# Patient Record
Sex: MALE | Race: WHITE
[De-identification: names, ages, dates, MRNs, and addresses within clinical notes are randomized per-mention and may not be internally consistent; named-entity substitution may affect disease eponyms.]

---

## 2019-02-12 ENCOUNTER — HOSPITAL ENCOUNTER (EMERGENCY)
Dept: HOSPITAL 89 - ER | Age: 59
Discharge: HOME | End: 2019-02-12
Payer: MEDICARE

## 2019-02-12 VITALS — DIASTOLIC BLOOD PRESSURE: 89 MMHG | SYSTOLIC BLOOD PRESSURE: 145 MMHG

## 2019-02-12 DIAGNOSIS — J44.1: Primary | ICD-10-CM

## 2019-02-12 LAB — PLATELET COUNT, AUTOMATED: 192 K/UL (ref 150–450)

## 2019-02-12 PROCEDURE — 82947 ASSAY GLUCOSE BLOOD QUANT: CPT

## 2019-02-12 PROCEDURE — 84484 ASSAY OF TROPONIN QUANT: CPT

## 2019-02-12 PROCEDURE — 82435 ASSAY OF BLOOD CHLORIDE: CPT

## 2019-02-12 PROCEDURE — 96374 THER/PROPH/DIAG INJ IV PUSH: CPT

## 2019-02-12 PROCEDURE — 85379 FIBRIN DEGRADATION QUANT: CPT

## 2019-02-12 PROCEDURE — 84450 TRANSFERASE (AST) (SGOT): CPT

## 2019-02-12 PROCEDURE — 82310 ASSAY OF CALCIUM: CPT

## 2019-02-12 PROCEDURE — 82040 ASSAY OF SERUM ALBUMIN: CPT

## 2019-02-12 PROCEDURE — 84075 ASSAY ALKALINE PHOSPHATASE: CPT

## 2019-02-12 PROCEDURE — 71046 X-RAY EXAM CHEST 2 VIEWS: CPT

## 2019-02-12 PROCEDURE — 99284 EMERGENCY DEPT VISIT MOD MDM: CPT

## 2019-02-12 PROCEDURE — 84460 ALANINE AMINO (ALT) (SGPT): CPT

## 2019-02-12 PROCEDURE — 84155 ASSAY OF PROTEIN SERUM: CPT

## 2019-02-12 PROCEDURE — 84295 ASSAY OF SERUM SODIUM: CPT

## 2019-02-12 PROCEDURE — 71275 CT ANGIOGRAPHY CHEST: CPT

## 2019-02-12 PROCEDURE — 82565 ASSAY OF CREATININE: CPT

## 2019-02-12 PROCEDURE — 82374 ASSAY BLOOD CARBON DIOXIDE: CPT

## 2019-02-12 PROCEDURE — 93005 ELECTROCARDIOGRAM TRACING: CPT

## 2019-02-12 PROCEDURE — 82247 BILIRUBIN TOTAL: CPT

## 2019-02-12 PROCEDURE — 84132 ASSAY OF SERUM POTASSIUM: CPT

## 2019-02-12 PROCEDURE — 85025 COMPLETE CBC W/AUTO DIFF WBC: CPT

## 2019-02-12 PROCEDURE — 94640 AIRWAY INHALATION TREATMENT: CPT

## 2019-02-12 PROCEDURE — 83880 ASSAY OF NATRIURETIC PEPTIDE: CPT

## 2019-02-12 PROCEDURE — 84520 ASSAY OF UREA NITROGEN: CPT

## 2019-02-12 NOTE — EKG
FACILITY: [facility] - [place] 

 

PATIENT NAME: TAMI BLAIR

: [id number]

MR: [id number]

V: [id number]

EXAM DATE: 

ORDERING PHYSICIAN: KISHOR PEREZ

TECHNOLOGIST: ROSALINE

 

Test Reason : SOB \ CP

Blood Pressure : ***/*** mmHG

Vent. Rate : 066 BPM     Atrial Rate : 066 BPM

   P-R Int : 224 ms          QRS Dur : 092 ms

    QT Int : 378 ms       P-R-T Axes : 045 040 074 degrees

   QTc Int : 396 ms

 

Sinus rhythm with 1st degree AV block

Low voltage QRS

Septal infarct , age undetermined

Abnormal ECG

No previous ECGs available

Confirmed by Sal Eng ([id number]) on 2019 9:47:21 PM

 

Referred By:  CHRIS           Confirmed By:Sal Mackay

## 2019-02-12 NOTE — ER REPORT
History and Physical


Time Seen By MD:  12:13


Hx. of Stated Complaint:  


Patient short of breath and nausea.  Ongoing for 4-5 days.


HPI/ROS


CHIEF COMPLAINT: Shortness of breath





HISTORY OF PRESENT ILLNESS: 58-year-old male patient presents to emergency room 


with complaints shortness of breath. Patient states that he is visiting from 


[place]. He states that his daughter was ill, they try to go to urgent care and


were sent to the ER because of his low oxygen saturation in his she didn't have 


an ID. Patient states that he has been feeling short of breath for the past 5 


days. States he has some occasional chest pain. He denies any vomiting or 


diarrhea, however he states he has been nauseated. States he does have some pain


in the right upper quadrant especially after eating. He states that the pain 


typically resolves on its own. He states that he's been out of his inhalers for 


the past several months. He states that typically he would get samples from his 


primary care provider. He states that he does have a history of COPD and 


believes that this is likely related to that.





REVIEW OF SYSTEMS:


Respiratory: As noted above


Cardiovascular: Occasional chest pain which resolved spontaneously


Gastrointestinal: As noted above.


Musculoskeletal: No back pain.


Allergies:  


Coded Allergies:  


     No Known Drug Allergies (Unverified , 2/12/19)


Home Meds


Active Scripts


Prednisone (PREDNISONE) 20 Mg Tablet, 20 MG PO BID, #8 TAB


   Prov:JAMSE JIMÉNEZ KHADIJAH         2/12/19


Past Medical/Surgical History


Patient has a past medical history of hypertension, MI, hyperlipidemia, COPD, he


will fissures, diverticulitis, fibromyalgia, type 2 diabetes, marijuana use, 


alcohol use, skin cancer.


Patient has a past surgical history of appendectomy, 2 vessel CABG.


Reviewed Nurses Notes:  Yes


Hx Substance Use Disorder:  Yes (occ marijauna)


Hx Alcohol Use:  Yes (rare)


Constitutional





Vital Sign - Last 24 Hours








 2/12/19 2/12/19 2/12/19 2/12/19





 11:41 12:05 12:12 12:35


 


Temp 97.6  98.5 


 


Pulse 104  73 75


 


Resp 20  20 18


 


B/P (MAP) 141/99  145/89 (107) 


 


Pulse Ox 83  90 


 


O2 Delivery Room Air  Nasal Cannula 


 


O2 Flow Rate  4.0  


 


    





 2/12/19 2/12/19  





 12:35 12:43  


 


Pulse  69  


 


Resp  18  


 


Pulse Ox 90   


 


O2 Delivery Nasal Cannula   


 


O2 Flow Rate 2.0   








Physical Exam


  General Appearance: The patient is alert, has no immediate need for airway 


protection and no current signs of toxicity.


Respiratory: Chest is non tender, lungs are diminished with wheezing throughout 


to auscultation.


Cardiac: regular rate and rhythm, patient does have swelling to left lower 


extremity, patient states is not abnormal for him.


Gastrointestinal: Abdomen is soft and non tender, no masses, bowel sounds 


normal.


Musculoskeletal:  Neck: Neck is supple and non tender.


   Extremities have full range of motion and are non tender.


Skin: No rashes or lesions.





DIFFERENTIAL DIAGNOSIS: After history and physical exam differential diagnosis 


was considered for shortness of breath including but not limited to pulmonary 


infectious process, COPD, asthma, pulmonary embolus and congestive heart 


failure.





Medical Decision Making


Data Points


Result Diagram:  


2/12/19 1238                                                                    


           2/12/19 1238





Laboratory





Hematology








Test


 2/12/19


12:38


 


Red Blood Count


 6.98 M/uL


(4.00-5.60)


 


Mean Corpuscular Volume


 86.4 fL


(80.0-96.0)


 


Mean Corpuscular Hemoglobin


 28.2 pg


(26.0-33.0)


 


Mean Corpuscular Hemoglobin


Concent 32.7 g/dL


(32.0-36.0)


 


Red Cell Distribution Width


 15.8 %


(11.5-14.5)


 


Mean Platelet Volume


 8.3 fL


(7.2-11.1)


 


Neutrophils (%) (Auto)


 47.2 %


(39.4-72.5)


 


Lymphocytes (%) (Auto)


 40.1 %


(17.6-49.6)


 


Monocytes (%) (Auto)


 10.9 %


(4.1-12.4)


 


Eosinophils (%) (Auto)


 1.1 %


(0.4-6.7)


 


Basophils (%) (Auto)


 0.7 %


(0.3-1.4)


 


Nucleated RBC Relative Count


(auto) 0.1 /100WBC 





 


Neutrophils # (Auto)


 3.1 K/uL


(2.0-7.4)


 


Lymphocytes # (Auto)


 2.7 K/uL


(1.3-3.6)


 


Monocytes # (Auto)


 0.7 K/uL


(0.3-1.0)


 


Eosinophils # (Auto)


 0.1 K/uL


(0.0-0.5)


 


Basophils # (Auto)


 0.0 K/uL


(0.0-0.1)


 


Nucleated RBC Absolute Count


(auto) 0.01 K/uL 





 


D-Dimer Quantitative (PE/DVT)


 2.96 ug/ml


(0-0.50)


 


Sodium Level


 138 mmol/L


(137-145)


 


Potassium Level


 4.4 mmol/L


(3.5-5.0)


 


Chloride Level


 107 mmol/L


()


 


Carbon Dioxide Level


 26 mmol/L


(22-30)


 


Blood Urea Nitrogen


 15 mg/dl


(9-21)


 


Creatinine


 0.70 mg/dl


(0.66-1.25)


 


Glomerular Filtration Rate


Calc > 60.0 





 


Random Glucose


 187 mg/dl


()


 


Calcium Level


 9.1 mg/dl


(8.4-10.2)


 


Total Bilirubin


 0.4 mg/dl


(0.2-1.3)


 


Aspartate Amino Transf


(AST/SGOT) 30 U/L (0-35) 





 


Alanine Aminotransferase


(ALT/SGPT) 47 U/L (0-56) 





 


Alkaline Phosphatase 97 U/L (0-126) 


 


Troponin I < 0.012 ng/ml 


 


B-Type Natriuretic Peptide


 < 5 pg/ml


(0-100)


 


Total Protein


 7.3 g/dl


(6.3-8.2)


 


Albumin


 4.0 g/dl


(3.5-5.0)








Chemistry








Test


 2/12/19


12:38


 


White Blood Count


 6.7 k/uL


(4.5-11.0)


 


Red Blood Count


 6.98 M/uL


(4.00-5.60)


 


Hemoglobin


 19.7 g/dL


(14.0-18.0)


 


Hematocrit


 60.3 %


(42.0-52.0)


 


Mean Corpuscular Volume


 86.4 fL


(80.0-96.0)


 


Mean Corpuscular Hemoglobin


 28.2 pg


(26.0-33.0)


 


Mean Corpuscular Hemoglobin


Concent 32.7 g/dL


(32.0-36.0)


 


Red Cell Distribution Width


 15.8 %


(11.5-14.5)


 


Platelet Count


 192 K/uL


(150-450)


 


Mean Platelet Volume


 8.3 fL


(7.2-11.1)


 


Neutrophils (%) (Auto)


 47.2 %


(39.4-72.5)


 


Lymphocytes (%) (Auto)


 40.1 %


(17.6-49.6)


 


Monocytes (%) (Auto)


 10.9 %


(4.1-12.4)


 


Eosinophils (%) (Auto)


 1.1 %


(0.4-6.7)


 


Basophils (%) (Auto)


 0.7 %


(0.3-1.4)


 


Nucleated RBC Relative Count


(auto) 0.1 /100WBC 





 


Neutrophils # (Auto)


 3.1 K/uL


(2.0-7.4)


 


Lymphocytes # (Auto)


 2.7 K/uL


(1.3-3.6)


 


Monocytes # (Auto)


 0.7 K/uL


(0.3-1.0)


 


Eosinophils # (Auto)


 0.1 K/uL


(0.0-0.5)


 


Basophils # (Auto)


 0.0 K/uL


(0.0-0.1)


 


Nucleated RBC Absolute Count


(auto) 0.01 K/uL 





 


D-Dimer Quantitative (PE/DVT)


 2.96 ug/ml


(0-0.50)


 


Glomerular Filtration Rate


Calc > 60.0 





 


Calcium Level


 9.1 mg/dl


(8.4-10.2)


 


Total Bilirubin


 0.4 mg/dl


(0.2-1.3)


 


Aspartate Amino Transf


(AST/SGOT) 30 U/L (0-35) 





 


Alanine Aminotransferase


(ALT/SGPT) 47 U/L (0-56) 





 


Alkaline Phosphatase 97 U/L (0-126) 


 


Troponin I < 0.012 ng/ml 


 


B-Type Natriuretic Peptide


 < 5 pg/ml


(0-100)


 


Total Protein


 7.3 g/dl


(6.3-8.2)


 


Albumin


 4.0 g/dl


(3.5-5.0)








Coagulation








Test


 2/12/19


12:38


 


D-Dimer Quantitative (PE/DVT) 2.96 ug/ml 











EKG/Imaging


Imaging


EXAMINATION:   CTA of the chest with IV contrast


 


HISTORY:  Shortness of breath. Elevated d-dimer.


 


TECHNIQUE:   Pulmonary embolus protocol - Thin axial CT images of the chest were


obtained with IV contrast during maximal pulmonary arterial opacification. 


Reconstruction of the source data includes multiplanar 2D coronal and sagittal 


reconstructed images, and 3D coronal and sagittal MIP images. Representative 


images have been stored on PACS.


One of the following dose optimization techniques was utilized in the 


performance of this exam: Automated exposure control; adjustment of the mA 


and/or kV according to the patient's size; or use of an iterative  reconstru


ction technique.  Specific details can be referenced in the facility's radiology


CT exam operational policy.


Contrast:   140 mL of IV Isovue-300.


 


COMPARISON:   None.


 


FINDINGS:


Pulmonary arteries:  The pulmonary arteries are moderately well opacified, 


without suspicious filling defect.


Heart, aorta, and great vessels:  Normal caliber thoracic aorta, without 


aneurysm or dissection. Sternotomy with surgical changes of CABG. Normal heart 


size. No pericardial effusion.


Lungs and pleura:  Slight scarring or atelectasis in the lower lungs. No 


suspicious focal consolidation. There is mild diffuse bronchial wall thickening.


No pleural effusion or pneumothorax.


Mediastinum and king:  Negative.


 


Visualized upper abdomen:  Unremarkable.


Chest wall:  Negative.


Bones:  No acute osseous findings. Scattered degenerative changes along the 


spine.


 


IMPRESSION:


1. No evidence of pulmonary embolism.


2. Mild diffuse bronchial wall thickening may be compatible with an acute or 


chronic bronchitis. No evidence of a focal pneumonia. Mild scarring or 


atelectasis in the lower lungs.


3. Sternotomy with surgical changes of CABG.


 


Report Dictated By: Anibal Wolff MD at 2/12/2019 2:09 PM


 


Report E-Signed By: Anibal Wolff MD  at 2/12/2019 2:18 PM





CHEST PA LAT


 


Indication: RESP DISTRESS


 


Comparison: None.


 


Findings:


 


Lungs: Prominent interstitial markings are seen to both lungs.  Linear atelectas


is/scar left lung base is seen.


 


Mediastinum/pulmonary vasculature: Heart size and pulmonary vasculature are 


normal.


 


Bones/soft tissues: Sternotomy wires are seen.


 


IMPRESSION:


Prominent interstitial markings both lungs.  Differential diagnosis includes 


chronic interstitial changes, and pulmonary vasculature congestion.


 


Report Dictated By: Gene Viveros at 2/12/2019 1:40 PM


 


Report E-Signed By: Gene Viveros  at 2/12/2019 1:41 PM





ED Course/Re-evaluation


ED Course


Patient was admitted to exam room, history and physical were obtained. 


Differential diagnoses were considered. On examination lungs are diminished with


wheezing, heart is regular, abdomen is soft and nontender. An IV was started, a 


CBC, CMP, BNP, troponin, EKG, chest x-ray, nebulizer treatment were done. 


Patient also received a dose of 125 mg slightly Medrol. A d-dimer was also done.


Troponin was negative, BNP was negative, CBC and CMP were also unremarkable. EKG


showed a normal sinus rhythm. Chest x-ray showed no acute findings but likely 


chronic bronchitis. Patient did have an elevated d-dimer of 2.5. I discussed 


this with the patient. We did go ahead and do a CT pulmonary angiogram. There is


no obvious clot found. They found signs of chronic bronchitis. I discussed 


findings with the patient. We will go ahead and discharge him home at this time.


Patient will be given an order for home oxygen, he does have home oxygen back 


home in [place] but did not bring it with him to [place]. Patient is follow-up 


with emergency room if condition worsens. Patient verbalized understanding and 


agreement with plan. We will go ahead and treat him with prednisone for the next


5 days, will also get him back on his inhalers, albuterol and Advair.


Decision to Disposition Date:  Feb 12, 2019


Decision to Disposition Time:  14:42





Depart


Departure


Latest Vital Signs





Vital Signs








  Date Time  Temp Pulse Resp B/P (MAP) Pulse Ox O2 Delivery O2 Flow Rate FiO2


 


2/12/19 12:43  69 18     


 


2/12/19 12:35     90 Nasal Cannula 2.0 


 


2/12/19 12:12 98.5   145/89 (107)    








Impression:  


   Primary Impression:  


   COPD exacerbation


Condition:  Improved


Disposition:  HOME OR SELF-CARE


New Scripts


Prednisone (PREDNISONE) 20 Mg Tablet


20 MG PO BID, #8 TAB


   Prov: JAMES JIMÉNEZ         2/12/19


Departure Forms:  ER Transition Record, Home Oxygen, Nebulizer RX,       Durable


Medical Equipment-Oxygen:  Oxygen Concentrator, Portable Oxygen Gas


   Reason for Use/Diagnosis:  COPD exacerbation, hypoxia


   Start Date of the Order:  Feb 12, 2019


   Dosage or Concentration (if applicable) - LPM:  2


   Route of Administration (if applicable):  Nasal Cannula


   Frequency of Use:  Continuous


   Duration Home O2 Required:  2


   Duration Units:  Weeks


   Room Air Oxygen Saturation:  83


   ER Prescribing Physician's Name:  James Jiménez


   NPI Numbers for Local ER MDs:  Tino [id number]


Medications Reconciliation,    Patient Portal Information


Patient Instructions:  COPD (Chronic Obstructive Pulmonary Disease) (ED)





Additional Instructions:  


Increase fluid intake.


Get plenty of rest.


Follow up with your primary care provider in the next week.


No Metformin for the next 3 days.


The Prednisone will make your blood sugar higher.


Return to the ER if condition worsens.











JAMES JIMÉNEZ                Feb 12, 2019 12:13

## 2019-02-12 NOTE — RADIOLOGY IMAGING REPORT
FACILITY: [facility] - [place] 

 

PATIENT NAME: Benjamin Uribe

: 1960

MR: [id number]

V: [id number]

EXAM DATE: 

ORDERING PHYSICIAN: KISHOR PEREZ

TECHNOLOGIST: 

 

Location: [facility]

Patient: Benjamin Uribe

: 1960

MRN: [id number]

Visit/Account:[id number]

Date of Sevice:  2019

 

ACCESSION #: [id number]

 

EXAMINATION:   CTA of the chest with IV contrast

 

HISTORY:  Shortness of breath. Elevated d-dimer.

 

TECHNIQUE:   Pulmonary embolus protocol - Thin axial CT images of the chest were obtained with IV con
trast during maximal pulmonary arterial opacification. Reconstruction of the source data includes mul
tiplanar 2D coronal and sagittal reconstructed images, and 3D coronal and sagittal MIP images. Repres
entative images have been stored on PACS.

One of the following dose optimization techniques was utilized in the performance of this exam: Autom
ated exposure control; adjustment of the mA and/or kV according to the patient's size; or use of an i
terative  reconstruction technique.  Specific details can be referenced in the facility's radiology C
T exam operational policy.

Contrast:   140 mL of IV Isovue-300.

 

COMPARISON:   None.

 

FINDINGS:

Pulmonary arteries:  The pulmonary arteries are moderately well opacified, without suspicious filling
 defect.

Heart, aorta, and great vessels:  Normal caliber thoracic aorta, without aneurysm or dissection. Ster
notomy with surgical changes of CABG. Normal heart size. No pericardial effusion.

Lungs and pleura:  Slight scarring or atelectasis in the lower lungs. No suspicious focal consolidati
on. There is mild diffuse bronchial wall thickening. No pleural effusion or pneumothorax.

Mediastinum and king:  Negative.

 

Visualized upper abdomen:  Unremarkable.

Chest wall:  Negative.

Bones:  No acute osseous findings. Scattered degenerative changes along the spine.

 

IMPRESSION:

1. No evidence of pulmonary embolism.

2. Mild diffuse bronchial wall thickening may be compatible with an acute or chronic bronchitis. No e
vidence of a focal pneumonia. Mild scarring or atelectasis in the lower lungs.

3. Sternotomy with surgical changes of CABG.

 

Report Dictated By: Anibal Wolff MD at 2019 2:09 PM

 

Report E-Signed By: Anibal Wolff MD  at 2019 2:18 PM

 

WSN:[id number]

## 2019-02-12 NOTE — RADIOLOGY IMAGING REPORT
FACILITY: [facility] - [place] 

 

PATIENT NAME: Benjamin Uribe

: 1960

MR: [id number]

V: [id number]

EXAM DATE: 

ORDERING PHYSICIAN: KISHOR PEREZ

TECHNOLOGIST: 

 

Location: [facility] - [place]

Patient: Benjamin Uribe

: 1960

MRN: [id number]

Visit/Account:[id number]

Date of Sevice:  2019

 

ACCESSION #: [id number]

 

CHEST PA LAT

 

Indication: RESP DISTRESS

 

Comparison: None.

 

Findings:

 

Lungs: Prominent interstitial markings are seen to both lungs.  Linear atelectasis/scar left lung bas
e is seen.

 

Mediastinum/pulmonary vasculature: Heart size and pulmonary vasculature are normal.

 

Bones/soft tissues: Sternotomy wires are seen.

 

IMPRESSION:

Prominent interstitial markings both lungs.  Differential diagnosis includes chronic interstitial ramses
nges, and pulmonary vasculature congestion.

 

 

 

 

 

Report Dictated By: Gene Viveros at 2019 1:40 PM

 

Report E-Signed By: Gene Viveros  at 2019 1:41 PM

 

WSN:[id number]

## 2020-05-16 ENCOUNTER — HOSPITAL ENCOUNTER (INPATIENT)
Dept: HOSPITAL 47 - EC | Age: 60
LOS: 2 days | Discharge: HOME | DRG: 638 | End: 2020-05-18
Attending: INTERNAL MEDICINE | Admitting: INTERNAL MEDICINE
Payer: MEDICARE

## 2020-05-16 VITALS — BODY MASS INDEX: 49.5 KG/M2

## 2020-05-16 DIAGNOSIS — Z83.3: ICD-10-CM

## 2020-05-16 DIAGNOSIS — Z80.0: ICD-10-CM

## 2020-05-16 DIAGNOSIS — Z82.3: ICD-10-CM

## 2020-05-16 DIAGNOSIS — L40.9: ICD-10-CM

## 2020-05-16 DIAGNOSIS — E66.01: ICD-10-CM

## 2020-05-16 DIAGNOSIS — Z95.1: ICD-10-CM

## 2020-05-16 DIAGNOSIS — Z11.59: ICD-10-CM

## 2020-05-16 DIAGNOSIS — T38.3X6A: ICD-10-CM

## 2020-05-16 DIAGNOSIS — I25.10: ICD-10-CM

## 2020-05-16 DIAGNOSIS — E11.65: Primary | ICD-10-CM

## 2020-05-16 DIAGNOSIS — E86.0: ICD-10-CM

## 2020-05-16 DIAGNOSIS — Z95.5: ICD-10-CM

## 2020-05-16 DIAGNOSIS — M79.7: ICD-10-CM

## 2020-05-16 DIAGNOSIS — Z90.49: ICD-10-CM

## 2020-05-16 DIAGNOSIS — F17.210: ICD-10-CM

## 2020-05-16 DIAGNOSIS — J44.9: ICD-10-CM

## 2020-05-16 DIAGNOSIS — Z91.120: ICD-10-CM

## 2020-05-16 LAB
ALBUMIN SERPL-MCNC: 3.7 G/DL (ref 3.5–5)
ALP SERPL-CCNC: 126 U/L (ref 38–126)
ALT SERPL-CCNC: 24 U/L (ref 4–49)
ANION GAP SERPL CALC-SCNC: 8 MMOL/L
AST SERPL-CCNC: 24 U/L (ref 17–59)
BASOPHILS # BLD AUTO: 0.1 K/UL (ref 0–0.2)
BASOPHILS NFR BLD AUTO: 1 %
BUN SERPL-SCNC: 19 MG/DL (ref 9–20)
CALCIUM SPEC-MCNC: 9.4 MG/DL (ref 8.4–10.2)
CHLORIDE SERPL-SCNC: 100 MMOL/L (ref 98–107)
CO2 SERPL-SCNC: 24 MMOL/L (ref 22–30)
EOSINOPHIL # BLD AUTO: 0.1 K/UL (ref 0–0.7)
EOSINOPHIL NFR BLD AUTO: 2 %
ERYTHROCYTE [DISTWIDTH] IN BLOOD BY AUTOMATED COUNT: 5.52 M/UL (ref 4.3–5.9)
ERYTHROCYTE [DISTWIDTH] IN BLOOD: 13.1 % (ref 11.5–15.5)
GLUCOSE BLD-MCNC: 226 MG/DL (ref 75–99)
GLUCOSE BLD-MCNC: 301 MG/DL (ref 75–99)
GLUCOSE BLD-MCNC: 305 MG/DL (ref 75–99)
GLUCOSE BLD-MCNC: 311 MG/DL (ref 75–99)
GLUCOSE BLD-MCNC: 326 MG/DL (ref 75–99)
GLUCOSE BLD-MCNC: 407 MG/DL (ref 75–99)
GLUCOSE BLD-MCNC: 408 MG/DL (ref 75–99)
GLUCOSE SERPL-MCNC: 436 MG/DL (ref 74–99)
GLUCOSE UR QL: (no result)
HCO3 BLDV-SCNC: 25 MMOL/L (ref 24–28)
HCT VFR BLD AUTO: 51.4 % (ref 39–53)
HGB BLD-MCNC: 16.5 GM/DL (ref 13–17.5)
KETONES UR QL STRIP.AUTO: (no result)
LYMPHOCYTES # SPEC AUTO: 2 K/UL (ref 1–4.8)
LYMPHOCYTES NFR SPEC AUTO: 34 %
MCH RBC QN AUTO: 29.8 PG (ref 25–35)
MCHC RBC AUTO-ENTMCNC: 32 G/DL (ref 31–37)
MCV RBC AUTO: 93.1 FL (ref 80–100)
MONOCYTES # BLD AUTO: 0.4 K/UL (ref 0–1)
MONOCYTES NFR BLD AUTO: 7 %
NEUTROPHILS # BLD AUTO: 3.1 K/UL (ref 1.3–7.7)
NEUTROPHILS NFR BLD AUTO: 53 %
PCO2 BLDV: 47 MMHG (ref 37–51)
PH BLDV: 7.34 [PH] (ref 7.31–7.41)
PH UR: 5 [PH] (ref 5–8)
PLATELET # BLD AUTO: 182 K/UL (ref 150–450)
POTASSIUM SERPL-SCNC: 4.6 MMOL/L (ref 3.5–5.1)
PROT SERPL-MCNC: 6.8 G/DL (ref 6.3–8.2)
SODIUM SERPL-SCNC: 132 MMOL/L (ref 137–145)
SP GR UR: 1.03 (ref 1–1.03)
UROBILINOGEN UR QL STRIP: <2 MG/DL (ref ?–2)
WBC # BLD AUTO: 5.9 K/UL (ref 3.8–10.6)

## 2020-05-16 PROCEDURE — 99291 CRITICAL CARE FIRST HOUR: CPT

## 2020-05-16 PROCEDURE — 81003 URINALYSIS AUTO W/O SCOPE: CPT

## 2020-05-16 PROCEDURE — 83036 HEMOGLOBIN GLYCOSYLATED A1C: CPT

## 2020-05-16 PROCEDURE — 93005 ELECTROCARDIOGRAM TRACING: CPT

## 2020-05-16 PROCEDURE — 85025 COMPLETE CBC W/AUTO DIFF WBC: CPT

## 2020-05-16 PROCEDURE — 80053 COMPREHEN METABOLIC PANEL: CPT

## 2020-05-16 PROCEDURE — 71046 X-RAY EXAM CHEST 2 VIEWS: CPT

## 2020-05-16 PROCEDURE — 87635 SARS-COV-2 COVID-19 AMP PRB: CPT

## 2020-05-16 PROCEDURE — 83880 ASSAY OF NATRIURETIC PEPTIDE: CPT

## 2020-05-16 PROCEDURE — 36415 COLL VENOUS BLD VENIPUNCTURE: CPT

## 2020-05-16 PROCEDURE — 82009 KETONE BODYS QUAL: CPT

## 2020-05-16 PROCEDURE — 82803 BLOOD GASES ANY COMBINATION: CPT

## 2020-05-16 PROCEDURE — 96360 HYDRATION IV INFUSION INIT: CPT

## 2020-05-16 RX ADMIN — INSULIN ASPART SCH UNIT: 100 INJECTION, SOLUTION INTRAVENOUS; SUBCUTANEOUS at 20:17

## 2020-05-16 RX ADMIN — CEFAZOLIN SCH MLS/HR: 330 INJECTION, POWDER, FOR SOLUTION INTRAMUSCULAR; INTRAVENOUS at 20:19

## 2020-05-16 RX ADMIN — FAMOTIDINE SCH MG: 10 INJECTION, SOLUTION INTRAVENOUS at 20:17

## 2020-05-16 RX ADMIN — HEPARIN SODIUM SCH UNIT: 5000 INJECTION, SOLUTION INTRAVENOUS; SUBCUTANEOUS at 20:17

## 2020-05-16 RX ADMIN — NICOTINE SCH PATCH: 21 PATCH, EXTENDED RELEASE TRANSDERMAL at 17:29

## 2020-05-16 RX ADMIN — INSULIN ASPART SCH UNIT: 100 INJECTION, SOLUTION INTRAVENOUS; SUBCUTANEOUS at 17:29

## 2020-05-17 LAB
GLUCOSE BLD-MCNC: 257 MG/DL (ref 75–99)
GLUCOSE BLD-MCNC: 257 MG/DL (ref 75–99)
GLUCOSE BLD-MCNC: 286 MG/DL (ref 75–99)
GLUCOSE BLD-MCNC: 313 MG/DL (ref 75–99)

## 2020-05-17 RX ADMIN — FAMOTIDINE SCH MG: 10 INJECTION, SOLUTION INTRAVENOUS at 21:27

## 2020-05-17 RX ADMIN — NYSTATIN SCH UNIT: 100000 SUSPENSION ORAL at 12:23

## 2020-05-17 RX ADMIN — FAMOTIDINE SCH MG: 10 INJECTION, SOLUTION INTRAVENOUS at 07:52

## 2020-05-17 RX ADMIN — INSULIN ASPART SCH UNIT: 100 INJECTION, SOLUTION INTRAVENOUS; SUBCUTANEOUS at 06:26

## 2020-05-17 RX ADMIN — NYSTATIN SCH UNIT: 100000 SUSPENSION ORAL at 21:26

## 2020-05-17 RX ADMIN — NYSTATIN SCH UNIT: 100000 SUSPENSION ORAL at 17:33

## 2020-05-17 RX ADMIN — NICOTINE SCH PATCH: 21 PATCH, EXTENDED RELEASE TRANSDERMAL at 07:52

## 2020-05-17 RX ADMIN — CEFAZOLIN SCH MLS/HR: 330 INJECTION, POWDER, FOR SOLUTION INTRAMUSCULAR; INTRAVENOUS at 07:53

## 2020-05-17 RX ADMIN — HEPARIN SODIUM SCH UNIT: 5000 INJECTION, SOLUTION INTRAVENOUS; SUBCUTANEOUS at 07:52

## 2020-05-17 RX ADMIN — INSULIN ASPART SCH UNIT: 100 INJECTION, SOLUTION INTRAVENOUS; SUBCUTANEOUS at 12:23

## 2020-05-17 RX ADMIN — INSULIN ASPART SCH UNIT: 100 INJECTION, SOLUTION INTRAVENOUS; SUBCUTANEOUS at 17:35

## 2020-05-17 RX ADMIN — HEPARIN SODIUM SCH UNIT: 5000 INJECTION, SOLUTION INTRAVENOUS; SUBCUTANEOUS at 21:26

## 2020-05-17 RX ADMIN — INSULIN ASPART SCH UNIT: 100 INJECTION, SOLUTION INTRAVENOUS; SUBCUTANEOUS at 21:27

## 2020-05-18 VITALS — DIASTOLIC BLOOD PRESSURE: 85 MMHG | TEMPERATURE: 98.2 F | HEART RATE: 70 BPM | SYSTOLIC BLOOD PRESSURE: 138 MMHG

## 2020-05-18 VITALS — RESPIRATION RATE: 18 BRPM

## 2020-05-18 LAB
GLUCOSE BLD-MCNC: 240 MG/DL (ref 75–99)
GLUCOSE BLD-MCNC: 249 MG/DL (ref 75–99)
GLUCOSE BLD-MCNC: 252 MG/DL (ref 75–99)
HBA1C MFR BLD: 15.7 % (ref 4–6)

## 2020-05-18 RX ADMIN — NYSTATIN SCH UNIT: 100000 SUSPENSION ORAL at 08:18

## 2020-05-18 RX ADMIN — NYSTATIN SCH UNIT: 100000 SUSPENSION ORAL at 12:36

## 2020-05-18 RX ADMIN — INSULIN ASPART SCH: 100 INJECTION, SOLUTION INTRAVENOUS; SUBCUTANEOUS at 17:46

## 2020-05-18 RX ADMIN — INSULIN ASPART SCH UNIT: 100 INJECTION, SOLUTION INTRAVENOUS; SUBCUTANEOUS at 08:20

## 2020-05-18 RX ADMIN — HEPARIN SODIUM SCH UNIT: 5000 INJECTION, SOLUTION INTRAVENOUS; SUBCUTANEOUS at 08:19

## 2020-05-18 RX ADMIN — NICOTINE SCH PATCH: 21 PATCH, EXTENDED RELEASE TRANSDERMAL at 08:19

## 2020-05-18 RX ADMIN — FAMOTIDINE SCH MG: 10 INJECTION, SOLUTION INTRAVENOUS at 08:18

## 2020-05-18 RX ADMIN — NYSTATIN SCH UNIT: 100000 SUSPENSION ORAL at 17:46

## 2020-05-18 RX ADMIN — INSULIN ASPART SCH UNIT: 100 INJECTION, SOLUTION INTRAVENOUS; SUBCUTANEOUS at 12:36

## 2021-11-26 ENCOUNTER — HOSPITAL ENCOUNTER (EMERGENCY)
Dept: HOSPITAL 47 - EC | Age: 61
Discharge: HOME | End: 2021-11-26
Payer: MEDICARE

## 2021-11-26 VITALS
DIASTOLIC BLOOD PRESSURE: 80 MMHG | RESPIRATION RATE: 20 BRPM | SYSTOLIC BLOOD PRESSURE: 127 MMHG | HEART RATE: 60 BPM | TEMPERATURE: 98.8 F

## 2021-11-26 DIAGNOSIS — E11.9: ICD-10-CM

## 2021-11-26 DIAGNOSIS — J44.9: ICD-10-CM

## 2021-11-26 DIAGNOSIS — M79.7: ICD-10-CM

## 2021-11-26 DIAGNOSIS — L03.116: Primary | ICD-10-CM

## 2021-11-26 DIAGNOSIS — E66.01: ICD-10-CM

## 2021-11-26 DIAGNOSIS — Z79.84: ICD-10-CM

## 2021-11-26 DIAGNOSIS — F12.90: ICD-10-CM

## 2021-11-26 DIAGNOSIS — Z79.4: ICD-10-CM

## 2021-11-26 LAB
ALBUMIN SERPL-MCNC: 3.6 G/DL (ref 3.5–5)
ALP SERPL-CCNC: 79 U/L (ref 38–126)
ALT SERPL-CCNC: 43 U/L (ref 4–49)
ANION GAP SERPL CALC-SCNC: 6 MMOL/L
AST SERPL-CCNC: 41 U/L (ref 17–59)
BASOPHILS # BLD AUTO: 0 K/UL (ref 0–0.2)
BASOPHILS NFR BLD AUTO: 1 %
BUN SERPL-SCNC: 18 MG/DL (ref 9–20)
CALCIUM SPEC-MCNC: 8.8 MG/DL (ref 8.4–10.2)
CHLORIDE SERPL-SCNC: 102 MMOL/L (ref 98–107)
CO2 SERPL-SCNC: 27 MMOL/L (ref 22–30)
EOSINOPHIL # BLD AUTO: 0.1 K/UL (ref 0–0.7)
EOSINOPHIL NFR BLD AUTO: 1 %
ERYTHROCYTE [DISTWIDTH] IN BLOOD BY AUTOMATED COUNT: 5.86 M/UL (ref 4.3–5.9)
ERYTHROCYTE [DISTWIDTH] IN BLOOD: 14.1 % (ref 11.5–15.5)
GLUCOSE SERPL-MCNC: 96 MG/DL (ref 74–99)
HCT VFR BLD AUTO: 52.2 % (ref 39–53)
HGB BLD-MCNC: 16.7 GM/DL (ref 13–17.5)
LYMPHOCYTES # SPEC AUTO: 1.5 K/UL (ref 1–4.8)
LYMPHOCYTES NFR SPEC AUTO: 33 %
MCH RBC QN AUTO: 28.4 PG (ref 25–35)
MCHC RBC AUTO-ENTMCNC: 32 G/DL (ref 31–37)
MCV RBC AUTO: 89 FL (ref 80–100)
MONOCYTES # BLD AUTO: 0.6 K/UL (ref 0–1)
MONOCYTES NFR BLD AUTO: 12 %
NEUTROPHILS # BLD AUTO: 2.4 K/UL (ref 1.3–7.7)
NEUTROPHILS NFR BLD AUTO: 51 %
PLATELET # BLD AUTO: 187 K/UL (ref 150–450)
POTASSIUM SERPL-SCNC: 4.6 MMOL/L (ref 3.5–5.1)
PROT SERPL-MCNC: 7.1 G/DL (ref 6.3–8.2)
SODIUM SERPL-SCNC: 135 MMOL/L (ref 137–145)
WBC # BLD AUTO: 4.7 K/UL (ref 3.8–10.6)

## 2021-11-26 PROCEDURE — 93971 EXTREMITY STUDY: CPT

## 2021-11-26 PROCEDURE — 73590 X-RAY EXAM OF LOWER LEG: CPT

## 2021-11-26 PROCEDURE — 87040 BLOOD CULTURE FOR BACTERIA: CPT

## 2021-11-26 PROCEDURE — 83605 ASSAY OF LACTIC ACID: CPT

## 2021-11-26 PROCEDURE — 85025 COMPLETE CBC W/AUTO DIFF WBC: CPT

## 2021-11-26 PROCEDURE — 96375 TX/PRO/DX INJ NEW DRUG ADDON: CPT

## 2021-11-26 PROCEDURE — 80053 COMPREHEN METABOLIC PANEL: CPT

## 2021-11-26 PROCEDURE — 99284 EMERGENCY DEPT VISIT MOD MDM: CPT

## 2021-11-26 PROCEDURE — 36415 COLL VENOUS BLD VENIPUNCTURE: CPT

## 2021-11-26 PROCEDURE — 96374 THER/PROPH/DIAG INJ IV PUSH: CPT

## 2021-12-05 ENCOUNTER — HOSPITAL ENCOUNTER (EMERGENCY)
Dept: HOSPITAL 47 - EC | Age: 61
Discharge: HOME | End: 2021-12-05
Payer: MEDICARE

## 2021-12-05 VITALS
HEART RATE: 61 BPM | SYSTOLIC BLOOD PRESSURE: 138 MMHG | DIASTOLIC BLOOD PRESSURE: 80 MMHG | TEMPERATURE: 98.9 F | RESPIRATION RATE: 15 BRPM

## 2021-12-05 DIAGNOSIS — L03.116: Primary | ICD-10-CM

## 2021-12-05 DIAGNOSIS — E11.9: ICD-10-CM

## 2021-12-05 DIAGNOSIS — Z79.82: ICD-10-CM

## 2021-12-05 DIAGNOSIS — J44.9: ICD-10-CM

## 2021-12-05 PROCEDURE — 96372 THER/PROPH/DIAG INJ SC/IM: CPT

## 2021-12-05 PROCEDURE — 99283 EMERGENCY DEPT VISIT LOW MDM: CPT

## 2022-04-26 ENCOUNTER — HOSPITAL ENCOUNTER (OUTPATIENT)
Dept: HOSPITAL 47 - EC | Age: 62
Setting detail: OBSERVATION
LOS: 1 days | Discharge: HOME | End: 2022-04-27
Attending: INTERNAL MEDICINE | Admitting: INTERNAL MEDICINE
Payer: MEDICARE

## 2022-04-26 DIAGNOSIS — G89.29: ICD-10-CM

## 2022-04-26 DIAGNOSIS — I89.0: ICD-10-CM

## 2022-04-26 DIAGNOSIS — Z80.0: ICD-10-CM

## 2022-04-26 DIAGNOSIS — Z90.49: ICD-10-CM

## 2022-04-26 DIAGNOSIS — Z82.49: ICD-10-CM

## 2022-04-26 DIAGNOSIS — E66.9: ICD-10-CM

## 2022-04-26 DIAGNOSIS — F17.200: ICD-10-CM

## 2022-04-26 DIAGNOSIS — E11.622: ICD-10-CM

## 2022-04-26 DIAGNOSIS — Z83.3: ICD-10-CM

## 2022-04-26 DIAGNOSIS — L40.9: ICD-10-CM

## 2022-04-26 DIAGNOSIS — Z79.84: ICD-10-CM

## 2022-04-26 DIAGNOSIS — Z95.1: ICD-10-CM

## 2022-04-26 DIAGNOSIS — Z71.3: ICD-10-CM

## 2022-04-26 DIAGNOSIS — R42: ICD-10-CM

## 2022-04-26 DIAGNOSIS — I87.332: ICD-10-CM

## 2022-04-26 DIAGNOSIS — L97.221: ICD-10-CM

## 2022-04-26 DIAGNOSIS — L03.116: Primary | ICD-10-CM

## 2022-04-26 DIAGNOSIS — M79.7: ICD-10-CM

## 2022-04-26 DIAGNOSIS — J44.9: ICD-10-CM

## 2022-04-26 DIAGNOSIS — Z82.3: ICD-10-CM

## 2022-04-26 DIAGNOSIS — Z79.4: ICD-10-CM

## 2022-04-26 LAB
ALBUMIN SERPL-MCNC: 3.7 G/DL (ref 3.5–5)
ALP SERPL-CCNC: 94 U/L (ref 38–126)
ALT SERPL-CCNC: 23 U/L (ref 4–49)
ANION GAP SERPL CALC-SCNC: 2 MMOL/L
AST SERPL-CCNC: 23 U/L (ref 17–59)
BASOPHILS # BLD AUTO: 0 K/UL (ref 0–0.2)
BASOPHILS NFR BLD AUTO: 1 %
BUN SERPL-SCNC: 15 MG/DL (ref 9–20)
CALCIUM SPEC-MCNC: 9 MG/DL (ref 8.4–10.2)
CHLORIDE SERPL-SCNC: 102 MMOL/L (ref 98–107)
CO2 SERPL-SCNC: 34 MMOL/L (ref 22–30)
EOSINOPHIL # BLD AUTO: 0.1 K/UL (ref 0–0.7)
EOSINOPHIL NFR BLD AUTO: 1 %
ERYTHROCYTE [DISTWIDTH] IN BLOOD BY AUTOMATED COUNT: 5.99 M/UL (ref 4.3–5.9)
ERYTHROCYTE [DISTWIDTH] IN BLOOD: 14.6 % (ref 11.5–15.5)
GLUCOSE BLD-MCNC: 159 MG/DL (ref 75–99)
GLUCOSE BLD-MCNC: 98 MG/DL (ref 75–99)
GLUCOSE SERPL-MCNC: 118 MG/DL (ref 74–99)
HCT VFR BLD AUTO: 53.9 % (ref 39–53)
HGB BLD-MCNC: 17.5 GM/DL (ref 13–17.5)
LYMPHOCYTES # SPEC AUTO: 1.5 K/UL (ref 1–4.8)
LYMPHOCYTES NFR SPEC AUTO: 22 %
MCH RBC QN AUTO: 29.3 PG (ref 25–35)
MCHC RBC AUTO-ENTMCNC: 32.5 G/DL (ref 31–37)
MCV RBC AUTO: 90 FL (ref 80–100)
MONOCYTES # BLD AUTO: 0.5 K/UL (ref 0–1)
MONOCYTES NFR BLD AUTO: 8 %
NEUTROPHILS # BLD AUTO: 4.6 K/UL (ref 1.3–7.7)
NEUTROPHILS NFR BLD AUTO: 67 %
PLATELET # BLD AUTO: 253 K/UL (ref 150–450)
POTASSIUM SERPL-SCNC: 4.6 MMOL/L (ref 3.5–5.1)
PROT SERPL-MCNC: 7.3 G/DL (ref 6.3–8.2)
SODIUM SERPL-SCNC: 138 MMOL/L (ref 137–145)
WBC # BLD AUTO: 6.9 K/UL (ref 3.8–10.6)

## 2022-04-26 PROCEDURE — 86140 C-REACTIVE PROTEIN: CPT

## 2022-04-26 PROCEDURE — 96376 TX/PRO/DX INJ SAME DRUG ADON: CPT

## 2022-04-26 PROCEDURE — 85025 COMPLETE CBC W/AUTO DIFF WBC: CPT

## 2022-04-26 PROCEDURE — 87186 SC STD MICRODIL/AGAR DIL: CPT

## 2022-04-26 PROCEDURE — 96375 TX/PRO/DX INJ NEW DRUG ADDON: CPT

## 2022-04-26 PROCEDURE — 96367 TX/PROPH/DG ADDL SEQ IV INF: CPT

## 2022-04-26 PROCEDURE — 99284 EMERGENCY DEPT VISIT MOD MDM: CPT

## 2022-04-26 PROCEDURE — 87070 CULTURE OTHR SPECIMN AEROBIC: CPT

## 2022-04-26 PROCEDURE — 36415 COLL VENOUS BLD VENIPUNCTURE: CPT

## 2022-04-26 PROCEDURE — 87040 BLOOD CULTURE FOR BACTERIA: CPT

## 2022-04-26 PROCEDURE — 87205 SMEAR GRAM STAIN: CPT

## 2022-04-26 PROCEDURE — 96372 THER/PROPH/DIAG INJ SC/IM: CPT

## 2022-04-26 PROCEDURE — 80053 COMPREHEN METABOLIC PANEL: CPT

## 2022-04-26 PROCEDURE — 83735 ASSAY OF MAGNESIUM: CPT

## 2022-04-26 PROCEDURE — 93971 EXTREMITY STUDY: CPT

## 2022-04-26 PROCEDURE — 96366 THER/PROPH/DIAG IV INF ADDON: CPT

## 2022-04-26 PROCEDURE — 96365 THER/PROPH/DIAG IV INF INIT: CPT

## 2022-04-26 PROCEDURE — 80048 BASIC METABOLIC PNL TOTAL CA: CPT

## 2022-04-26 PROCEDURE — 87077 CULTURE AEROBIC IDENTIFY: CPT

## 2022-04-26 RX ADMIN — HEPARIN SODIUM SCH UNIT: 5000 INJECTION INTRAVENOUS; SUBCUTANEOUS at 17:43

## 2022-04-26 RX ADMIN — SODIUM CHLORIDE SCH MLS/HR: 9 INJECTION, SOLUTION INTRAVENOUS at 21:26

## 2022-04-26 RX ADMIN — HYDROMORPHONE HYDROCHLORIDE PRN MG: 1 INJECTION, SOLUTION INTRAMUSCULAR; INTRAVENOUS; SUBCUTANEOUS at 21:39

## 2022-04-26 RX ADMIN — INSULIN ASPART SCH UNIT: 100 INJECTION, SOLUTION INTRAVENOUS; SUBCUTANEOUS at 17:42

## 2022-04-26 RX ADMIN — INSULIN ASPART SCH UNIT: 100 INJECTION, SUSPENSION SUBCUTANEOUS at 18:03

## 2022-04-26 RX ADMIN — HEPARIN SODIUM SCH UNIT: 5000 INJECTION INTRAVENOUS; SUBCUTANEOUS at 21:26

## 2022-04-26 RX ADMIN — HYDROMORPHONE HYDROCHLORIDE PRN MG: 1 INJECTION, SOLUTION INTRAMUSCULAR; INTRAVENOUS; SUBCUTANEOUS at 18:02

## 2022-04-26 RX ADMIN — HYDROMORPHONE HYDROCHLORIDE PRN MG: 1 INJECTION, SOLUTION INTRAMUSCULAR; INTRAVENOUS; SUBCUTANEOUS at 14:06

## 2022-04-27 VITALS
TEMPERATURE: 98.1 F | DIASTOLIC BLOOD PRESSURE: 81 MMHG | SYSTOLIC BLOOD PRESSURE: 127 MMHG | RESPIRATION RATE: 16 BRPM | HEART RATE: 62 BPM

## 2022-04-27 LAB
ANION GAP SERPL CALC-SCNC: 10 MMOL/L (ref 10–18)
BASOPHILS # BLD AUTO: 0.03 X 10*3/UL (ref 0–0.1)
BASOPHILS NFR BLD AUTO: 0.5 %
BUN SERPL-SCNC: 10.4 MG/DL (ref 9–27)
BUN/CREAT SERPL: 14.86 RATIO (ref 12–20)
CALCIUM SPEC-MCNC: 8.9 MG/DL (ref 8.7–10.3)
CHLORIDE SERPL-SCNC: 98 MMOL/L (ref 96–109)
CO2 SERPL-SCNC: 28 MMOL/L (ref 20–27.5)
EOSINOPHIL # BLD AUTO: 0.07 X 10*3/UL (ref 0.04–0.35)
EOSINOPHIL NFR BLD AUTO: 1.1 %
ERYTHROCYTE [DISTWIDTH] IN BLOOD BY AUTOMATED COUNT: 5.81 X 10*6/UL (ref 4.4–5.6)
ERYTHROCYTE [DISTWIDTH] IN BLOOD: 14.6 % (ref 11.5–14.5)
GLUCOSE BLD-MCNC: 109 MG/DL (ref 75–99)
GLUCOSE BLD-MCNC: 117 MG/DL (ref 75–99)
GLUCOSE SERPL-MCNC: 121 MG/DL (ref 70–110)
HCT VFR BLD AUTO: 52.1 % (ref 39.6–50)
HGB BLD-MCNC: 16 G/DL (ref 13–17)
IMM GRANULOCYTES BLD QL AUTO: 0.8 %
LYMPHOCYTES # SPEC AUTO: 1.47 X 10*3/UL (ref 0.9–5)
LYMPHOCYTES NFR SPEC AUTO: 22.8 %
MAGNESIUM SPEC-SCNC: 2.1 MG/DL (ref 1.5–2.4)
MCH RBC QN AUTO: 27.5 PG (ref 27–32)
MCHC RBC AUTO-ENTMCNC: 30.7 G/DL (ref 32–37)
MCV RBC AUTO: 89.7 FL (ref 80–97)
MONOCYTES # BLD AUTO: 0.74 X 10*3/UL (ref 0.2–1)
MONOCYTES NFR BLD AUTO: 11.5 %
NEUTROPHILS # BLD AUTO: 4.08 X 10*3/UL (ref 1.8–7.7)
NEUTROPHILS NFR BLD AUTO: 63.3 %
NRBC BLD AUTO-RTO: 0 /100 WBCS (ref 0–0)
PLATELET # BLD AUTO: 231 X 10*3/UL (ref 140–440)
POTASSIUM SERPL-SCNC: 4.5 MMOL/L (ref 3.5–5.5)
SODIUM SERPL-SCNC: 136 MMOL/L (ref 135–145)
WBC # BLD AUTO: 6.44 X 10*3/UL (ref 4.5–10)

## 2022-04-27 RX ADMIN — HYDROMORPHONE HYDROCHLORIDE PRN MG: 1 INJECTION, SOLUTION INTRAMUSCULAR; INTRAVENOUS; SUBCUTANEOUS at 06:27

## 2022-04-27 RX ADMIN — SODIUM CHLORIDE SCH MLS/HR: 9 INJECTION, SOLUTION INTRAVENOUS at 06:26

## 2022-04-27 RX ADMIN — INSULIN ASPART SCH: 100 INJECTION, SOLUTION INTRAVENOUS; SUBCUTANEOUS at 12:10

## 2022-04-27 RX ADMIN — HEPARIN SODIUM SCH UNIT: 5000 INJECTION INTRAVENOUS; SUBCUTANEOUS at 08:07

## 2022-04-27 RX ADMIN — INSULIN ASPART SCH UNIT: 100 INJECTION, SUSPENSION SUBCUTANEOUS at 08:07

## 2022-04-27 RX ADMIN — INSULIN ASPART SCH: 100 INJECTION, SOLUTION INTRAVENOUS; SUBCUTANEOUS at 08:05

## 2022-04-27 RX ADMIN — HYDROMORPHONE HYDROCHLORIDE PRN MG: 1 INJECTION, SOLUTION INTRAMUSCULAR; INTRAVENOUS; SUBCUTANEOUS at 02:36

## 2022-07-14 ENCOUNTER — HOSPITAL ENCOUNTER (OUTPATIENT)
Dept: HOSPITAL 47 - LABWHC1 | Age: 62
Discharge: HOME | End: 2022-07-14
Attending: NURSE PRACTITIONER
Payer: MEDICARE

## 2022-07-14 DIAGNOSIS — L40.0: Primary | ICD-10-CM

## 2022-07-14 LAB
BASOPHILS # BLD AUTO: 0.04 X 10*3/UL (ref 0–0.1)
BASOPHILS NFR BLD AUTO: 0.6 %
EOSINOPHIL # BLD AUTO: 0.08 X 10*3/UL (ref 0.04–0.35)
EOSINOPHIL NFR BLD AUTO: 1.2 %
ERYTHROCYTE [DISTWIDTH] IN BLOOD BY AUTOMATED COUNT: 6 X 10*6/UL (ref 4.4–5.6)
ERYTHROCYTE [DISTWIDTH] IN BLOOD: 14.3 % (ref 11.5–14.5)
HCT VFR BLD AUTO: 54.2 % (ref 39.6–50)
HGB BLD-MCNC: 16.9 G/DL (ref 13–17)
IMM GRANULOCYTES BLD QL AUTO: 0.9 %
LYMPHOCYTES # SPEC AUTO: 1.87 X 10*3/UL (ref 0.9–5)
LYMPHOCYTES NFR SPEC AUTO: 28.1 %
MCH RBC QN AUTO: 28.2 PG (ref 27–32)
MCHC RBC AUTO-ENTMCNC: 31.2 G/DL (ref 32–37)
MCV RBC AUTO: 90.3 FL (ref 80–97)
MONOCYTES # BLD AUTO: 0.63 X 10*3/UL (ref 0.2–1)
MONOCYTES NFR BLD AUTO: 9.5 %
NEUTROPHILS # BLD AUTO: 3.97 X 10*3/UL (ref 1.8–7.7)
NEUTROPHILS NFR BLD AUTO: 59.7 %
NRBC BLD AUTO-RTO: 0 /100 WBCS (ref 0–0)
PLATELET # BLD AUTO: 227 X 10*3/UL (ref 140–440)
WBC # BLD AUTO: 6.65 X 10*3/UL (ref 4.5–10)

## 2022-07-14 PROCEDURE — 84460 ALANINE AMINO (ALT) (SGPT): CPT

## 2022-07-14 PROCEDURE — 85025 COMPLETE CBC W/AUTO DIFF WBC: CPT

## 2022-07-14 PROCEDURE — 87340 HEPATITIS B SURFACE AG IA: CPT

## 2022-07-14 PROCEDURE — 36415 COLL VENOUS BLD VENIPUNCTURE: CPT

## 2022-07-14 PROCEDURE — 84450 TRANSFERASE (AST) (SGOT): CPT

## 2022-07-14 PROCEDURE — 82565 ASSAY OF CREATININE: CPT

## 2022-07-14 PROCEDURE — 86706 HEP B SURFACE ANTIBODY: CPT

## 2022-07-14 PROCEDURE — 86480 TB TEST CELL IMMUN MEASURE: CPT

## 2022-07-15 LAB
ALT SERPL-CCNC: 23 U/L (ref 10–49)
AST SERPL-CCNC: 21 U/L (ref 14–35)

## 2022-12-14 ENCOUNTER — HOSPITAL ENCOUNTER (OUTPATIENT)
Dept: HOSPITAL 47 - LABWHC1 | Age: 62
Discharge: HOME | End: 2022-12-14
Attending: DERMATOLOGY
Payer: MEDICARE

## 2022-12-14 DIAGNOSIS — R20.2: ICD-10-CM

## 2022-12-14 DIAGNOSIS — I25.10: ICD-10-CM

## 2022-12-14 DIAGNOSIS — L40.0: ICD-10-CM

## 2022-12-14 DIAGNOSIS — G47.30: ICD-10-CM

## 2022-12-14 DIAGNOSIS — R07.9: ICD-10-CM

## 2022-12-14 DIAGNOSIS — I10: Primary | ICD-10-CM

## 2022-12-14 DIAGNOSIS — E11.9: ICD-10-CM

## 2022-12-14 PROCEDURE — 80061 LIPID PANEL: CPT

## 2022-12-14 PROCEDURE — 80053 COMPREHEN METABOLIC PANEL: CPT

## 2022-12-14 PROCEDURE — 93005 ELECTROCARDIOGRAM TRACING: CPT

## 2022-12-14 PROCEDURE — 83036 HEMOGLOBIN GLYCOSYLATED A1C: CPT

## 2022-12-14 PROCEDURE — 36415 COLL VENOUS BLD VENIPUNCTURE: CPT

## 2022-12-14 PROCEDURE — 84439 ASSAY OF FREE THYROXINE: CPT

## 2022-12-14 PROCEDURE — 84443 ASSAY THYROID STIM HORMONE: CPT

## 2022-12-15 LAB
ALBUMIN SERPL-MCNC: 4.1 G/DL (ref 3.8–4.9)
ALBUMIN/GLOB SERPL: 1.29 G/DL (ref 1.6–3.17)
ALP SERPL-CCNC: 91 U/L (ref 41–126)
ALT SERPL-CCNC: 43 U/L (ref 10–49)
ANION GAP SERPL CALC-SCNC: 11.7 MMOL/L (ref 10–18)
AST SERPL-CCNC: 28 U/L (ref 14–35)
BUN SERPL-SCNC: 9.5 MG/DL (ref 9–27)
BUN/CREAT SERPL: 13.71 RATIO (ref 12–20)
CALCIUM SPEC-MCNC: 9.2 MG/DL (ref 8.7–10.3)
CHLORIDE SERPL-SCNC: 98 MMOL/L (ref 96–109)
CHOLEST SERPL-MCNC: 207 MG/DL (ref 0–200)
CO2 SERPL-SCNC: 28.6 MMOL/L (ref 20–27.5)
GLOBULIN SER CALC-MCNC: 3.2 G/DL (ref 1.6–3.3)
GLUCOSE SERPL-MCNC: 131 MG/DL (ref 70–110)
HDLC SERPL-MCNC: 32.3 MG/DL (ref 40–60)
LDLC SERPL CALC-MCNC: 149.9 MG/DL (ref 0–131)
POTASSIUM SERPL-SCNC: 4.4 MMOL/L (ref 3.5–5.5)
PROT SERPL-MCNC: 7.3 G/DL (ref 6.2–8.2)
SODIUM SERPL-SCNC: 139 MMOL/L (ref 135–145)
T4 FREE SERPL-MCNC: 1.3 NG/DL (ref 0.8–1.8)
TRIGL SERPL-MCNC: 124 MG/DL (ref 0–149)
VLDLC SERPL CALC-MCNC: 24.8 MG/DL (ref 5–40)

## 2022-12-16 ENCOUNTER — HOSPITAL ENCOUNTER (INPATIENT)
Dept: HOSPITAL 47 - EC | Age: 62
LOS: 3 days | Discharge: HOME | DRG: 191 | End: 2022-12-19
Attending: HOSPITALIST | Admitting: HOSPITALIST
Payer: MEDICARE

## 2022-12-16 DIAGNOSIS — F12.90: ICD-10-CM

## 2022-12-16 DIAGNOSIS — Z91.14: ICD-10-CM

## 2022-12-16 DIAGNOSIS — R42: ICD-10-CM

## 2022-12-16 DIAGNOSIS — J44.1: Primary | ICD-10-CM

## 2022-12-16 DIAGNOSIS — R60.0: ICD-10-CM

## 2022-12-16 DIAGNOSIS — M47.892: ICD-10-CM

## 2022-12-16 DIAGNOSIS — E87.70: ICD-10-CM

## 2022-12-16 DIAGNOSIS — Z91.199: ICD-10-CM

## 2022-12-16 DIAGNOSIS — L40.9: ICD-10-CM

## 2022-12-16 DIAGNOSIS — R09.02: ICD-10-CM

## 2022-12-16 DIAGNOSIS — I25.119: ICD-10-CM

## 2022-12-16 DIAGNOSIS — E66.01: ICD-10-CM

## 2022-12-16 DIAGNOSIS — E11.9: ICD-10-CM

## 2022-12-16 DIAGNOSIS — E78.5: ICD-10-CM

## 2022-12-16 DIAGNOSIS — Z79.899: ICD-10-CM

## 2022-12-16 DIAGNOSIS — Z95.1: ICD-10-CM

## 2022-12-16 DIAGNOSIS — F17.210: ICD-10-CM

## 2022-12-16 DIAGNOSIS — I87.2: ICD-10-CM

## 2022-12-16 DIAGNOSIS — Z71.6: ICD-10-CM

## 2022-12-16 DIAGNOSIS — M79.7: ICD-10-CM

## 2022-12-16 DIAGNOSIS — R07.89: ICD-10-CM

## 2022-12-16 DIAGNOSIS — E11.65: ICD-10-CM

## 2022-12-16 DIAGNOSIS — G47.33: ICD-10-CM

## 2022-12-16 DIAGNOSIS — Z79.4: ICD-10-CM

## 2022-12-16 DIAGNOSIS — I25.2: ICD-10-CM

## 2022-12-16 DIAGNOSIS — Z79.84: ICD-10-CM

## 2022-12-16 LAB
ALBUMIN SERPL-MCNC: 3.5 G/DL (ref 3.5–5)
ALP SERPL-CCNC: 78 U/L (ref 38–126)
ALT SERPL-CCNC: 34 U/L (ref 4–49)
ANION GAP SERPL CALC-SCNC: 7 MMOL/L
APTT BLD: 24.6 SEC (ref 22–30)
AST SERPL-CCNC: 27 U/L (ref 17–59)
BASOPHILS # BLD AUTO: 0.1 K/UL (ref 0–0.2)
BASOPHILS NFR BLD AUTO: 1 %
BUN SERPL-SCNC: 14 MG/DL (ref 9–20)
CALCIUM SPEC-MCNC: 8.6 MG/DL (ref 8.4–10.2)
CHLORIDE SERPL-SCNC: 102 MMOL/L (ref 98–107)
CO2 SERPL-SCNC: 29 MMOL/L (ref 22–30)
EOSINOPHIL # BLD AUTO: 0.1 K/UL (ref 0–0.7)
EOSINOPHIL NFR BLD AUTO: 1 %
ERYTHROCYTE [DISTWIDTH] IN BLOOD BY AUTOMATED COUNT: 5.8 M/UL (ref 4.3–5.9)
ERYTHROCYTE [DISTWIDTH] IN BLOOD: 13.6 % (ref 11.5–15.5)
GLUCOSE BLD-MCNC: 159 MG/DL (ref 70–110)
GLUCOSE SERPL-MCNC: 110 MG/DL (ref 74–99)
HCT VFR BLD AUTO: 52.6 % (ref 39–53)
HGB BLD-MCNC: 17.2 GM/DL (ref 13–17.5)
INR PPP: 1 (ref ?–1.2)
LYMPHOCYTES # SPEC AUTO: 2 K/UL (ref 1–4.8)
LYMPHOCYTES NFR SPEC AUTO: 30 %
MAGNESIUM SPEC-SCNC: 1.9 MG/DL (ref 1.6–2.3)
MCH RBC QN AUTO: 29.6 PG (ref 25–35)
MCHC RBC AUTO-ENTMCNC: 32.7 G/DL (ref 31–37)
MCV RBC AUTO: 90.7 FL (ref 80–100)
MONOCYTES # BLD AUTO: 0.6 K/UL (ref 0–1)
MONOCYTES NFR BLD AUTO: 9 %
NEUTROPHILS # BLD AUTO: 3.7 K/UL (ref 1.3–7.7)
NEUTROPHILS NFR BLD AUTO: 56 %
PLATELET # BLD AUTO: 210 K/UL (ref 150–450)
POTASSIUM SERPL-SCNC: 4.2 MMOL/L (ref 3.5–5.1)
PROT SERPL-MCNC: 6.6 G/DL (ref 6.3–8.2)
PT BLD: 10.3 SEC (ref 9–12)
SODIUM SERPL-SCNC: 138 MMOL/L (ref 137–145)
WBC # BLD AUTO: 6.6 K/UL (ref 3.8–10.6)

## 2022-12-16 PROCEDURE — 84443 ASSAY THYROID STIM HORMONE: CPT

## 2022-12-16 PROCEDURE — 80053 COMPREHEN METABOLIC PANEL: CPT

## 2022-12-16 PROCEDURE — 93306 TTE W/DOPPLER COMPLETE: CPT

## 2022-12-16 PROCEDURE — 80048 BASIC METABOLIC PNL TOTAL CA: CPT

## 2022-12-16 PROCEDURE — 85025 COMPLETE CBC W/AUTO DIFF WBC: CPT

## 2022-12-16 PROCEDURE — 83735 ASSAY OF MAGNESIUM: CPT

## 2022-12-16 PROCEDURE — 71046 X-RAY EXAM CHEST 2 VIEWS: CPT

## 2022-12-16 PROCEDURE — 85610 PROTHROMBIN TIME: CPT

## 2022-12-16 PROCEDURE — 83036 HEMOGLOBIN GLYCOSYLATED A1C: CPT

## 2022-12-16 PROCEDURE — 84484 ASSAY OF TROPONIN QUANT: CPT

## 2022-12-16 PROCEDURE — 99285 EMERGENCY DEPT VISIT HI MDM: CPT

## 2022-12-16 PROCEDURE — 93005 ELECTROCARDIOGRAM TRACING: CPT

## 2022-12-16 PROCEDURE — 94640 AIRWAY INHALATION TREATMENT: CPT

## 2022-12-16 PROCEDURE — 36415 COLL VENOUS BLD VENIPUNCTURE: CPT

## 2022-12-16 PROCEDURE — 85730 THROMBOPLASTIN TIME PARTIAL: CPT

## 2022-12-16 PROCEDURE — 72052 X-RAY EXAM NECK SPINE 6/>VWS: CPT

## 2022-12-16 PROCEDURE — 84439 ASSAY OF FREE THYROXINE: CPT

## 2022-12-16 PROCEDURE — 96374 THER/PROPH/DIAG INJ IV PUSH: CPT

## 2022-12-16 PROCEDURE — 83880 ASSAY OF NATRIURETIC PEPTIDE: CPT

## 2022-12-16 PROCEDURE — 80061 LIPID PANEL: CPT

## 2022-12-16 PROCEDURE — 94760 N-INVAS EAR/PLS OXIMETRY 1: CPT

## 2022-12-17 LAB
CHOLEST SERPL-MCNC: 181 MG/DL (ref 0–200)
GLUCOSE BLD-MCNC: 129 MG/DL (ref 70–110)
GLUCOSE BLD-MCNC: 177 MG/DL (ref 70–110)
GLUCOSE BLD-MCNC: 194 MG/DL (ref 70–110)
GLUCOSE BLD-MCNC: 215 MG/DL (ref 70–110)
HDLC SERPL-MCNC: 28.7 MG/DL (ref 40–60)
LDLC SERPL CALC-MCNC: 112.5 MG/DL (ref 0–131)
TRIGL SERPL-MCNC: 199 MG/DL (ref 0–149)
VLDLC SERPL CALC-MCNC: 39.8 MG/DL (ref 5–40)

## 2022-12-17 RX ADMIN — FUROSEMIDE SCH MG: 10 INJECTION, SOLUTION INTRAMUSCULAR; INTRAVENOUS at 20:29

## 2022-12-17 RX ADMIN — BUDESONIDE AND FORMOTEROL FUMARATE DIHYDRATE SCH: 160; 4.5 AEROSOL RESPIRATORY (INHALATION) at 21:28

## 2022-12-17 RX ADMIN — IPRATROPIUM BROMIDE AND ALBUTEROL SULFATE SCH: .5; 3 SOLUTION RESPIRATORY (INHALATION) at 21:28

## 2022-12-17 RX ADMIN — KETOROLAC TROMETHAMINE PRN MG: 15 INJECTION, SOLUTION INTRAMUSCULAR; INTRAVENOUS at 12:37

## 2022-12-17 RX ADMIN — ISOSORBIDE MONONITRATE SCH MG: 30 TABLET, EXTENDED RELEASE ORAL at 13:36

## 2022-12-17 RX ADMIN — ATORVASTATIN CALCIUM SCH MG: 40 TABLET, FILM COATED ORAL at 13:36

## 2022-12-17 RX ADMIN — KETOROLAC TROMETHAMINE PRN MG: 15 INJECTION, SOLUTION INTRAMUSCULAR; INTRAVENOUS at 20:28

## 2022-12-17 RX ADMIN — FUROSEMIDE SCH MG: 10 INJECTION, SOLUTION INTRAMUSCULAR; INTRAVENOUS at 13:36

## 2022-12-17 RX ADMIN — IPRATROPIUM BROMIDE AND ALBUTEROL SULFATE SCH: .5; 3 SOLUTION RESPIRATORY (INHALATION) at 21:37

## 2022-12-18 VITALS — RESPIRATION RATE: 18 BRPM

## 2022-12-18 LAB
ANION GAP SERPL CALC-SCNC: 8 MMOL/L
BUN SERPL-SCNC: 14 MG/DL (ref 9–20)
CALCIUM SPEC-MCNC: 8.6 MG/DL (ref 8.4–10.2)
CHLORIDE SERPL-SCNC: 101 MMOL/L (ref 98–107)
CO2 SERPL-SCNC: 25 MMOL/L (ref 22–30)
GLUCOSE BLD-MCNC: 154 MG/DL (ref 70–110)
GLUCOSE BLD-MCNC: 168 MG/DL (ref 70–110)
GLUCOSE BLD-MCNC: 176 MG/DL (ref 70–110)
GLUCOSE BLD-MCNC: 181 MG/DL (ref 70–110)
GLUCOSE SERPL-MCNC: 219 MG/DL (ref 74–99)
POTASSIUM SERPL-SCNC: 4.7 MMOL/L (ref 3.5–5.1)
SODIUM SERPL-SCNC: 134 MMOL/L (ref 137–145)

## 2022-12-18 RX ADMIN — ISOSORBIDE MONONITRATE SCH MG: 30 TABLET, EXTENDED RELEASE ORAL at 09:11

## 2022-12-18 RX ADMIN — IPRATROPIUM BROMIDE AND ALBUTEROL SULFATE SCH: .5; 3 SOLUTION RESPIRATORY (INHALATION) at 11:48

## 2022-12-18 RX ADMIN — IPRATROPIUM BROMIDE AND ALBUTEROL SULFATE SCH: .5; 3 SOLUTION RESPIRATORY (INHALATION) at 15:46

## 2022-12-18 RX ADMIN — ASPIRIN 81 MG CHEWABLE TABLET SCH MG: 81 TABLET CHEWABLE at 09:11

## 2022-12-18 RX ADMIN — KETOROLAC TROMETHAMINE PRN MG: 15 INJECTION, SOLUTION INTRAMUSCULAR; INTRAVENOUS at 20:54

## 2022-12-18 RX ADMIN — ENOXAPARIN SODIUM SCH MG: 60 INJECTION SUBCUTANEOUS at 09:11

## 2022-12-18 RX ADMIN — PANTOPRAZOLE SODIUM SCH MG: 40 TABLET, DELAYED RELEASE ORAL at 06:26

## 2022-12-18 RX ADMIN — IPRATROPIUM BROMIDE AND ALBUTEROL SULFATE SCH: .5; 3 SOLUTION RESPIRATORY (INHALATION) at 07:36

## 2022-12-18 RX ADMIN — BUDESONIDE AND FORMOTEROL FUMARATE DIHYDRATE SCH PUFF: 160; 4.5 AEROSOL RESPIRATORY (INHALATION) at 21:16

## 2022-12-18 RX ADMIN — FUROSEMIDE SCH MG: 10 INJECTION, SOLUTION INTRAMUSCULAR; INTRAVENOUS at 20:55

## 2022-12-18 RX ADMIN — FUROSEMIDE SCH MG: 10 INJECTION, SOLUTION INTRAMUSCULAR; INTRAVENOUS at 09:11

## 2022-12-18 RX ADMIN — IPRATROPIUM BROMIDE AND ALBUTEROL SULFATE SCH: .5; 3 SOLUTION RESPIRATORY (INHALATION) at 21:19

## 2022-12-18 RX ADMIN — BUDESONIDE AND FORMOTEROL FUMARATE DIHYDRATE SCH PUFF: 160; 4.5 AEROSOL RESPIRATORY (INHALATION) at 07:31

## 2022-12-18 RX ADMIN — ATORVASTATIN CALCIUM SCH MG: 40 TABLET, FILM COATED ORAL at 09:11

## 2022-12-19 VITALS — DIASTOLIC BLOOD PRESSURE: 67 MMHG | SYSTOLIC BLOOD PRESSURE: 105 MMHG

## 2022-12-19 VITALS — TEMPERATURE: 97.6 F

## 2022-12-19 VITALS — HEART RATE: 52 BPM

## 2022-12-19 LAB
ANION GAP SERPL CALC-SCNC: 7 MMOL/L
BUN SERPL-SCNC: 15 MG/DL (ref 9–20)
CALCIUM SPEC-MCNC: 8.4 MG/DL (ref 8.4–10.2)
CHLORIDE SERPL-SCNC: 102 MMOL/L (ref 98–107)
CO2 SERPL-SCNC: 25 MMOL/L (ref 22–30)
GLUCOSE BLD-MCNC: 152 MG/DL (ref 70–110)
GLUCOSE BLD-MCNC: 209 MG/DL (ref 70–110)
GLUCOSE SERPL-MCNC: 204 MG/DL (ref 74–99)
POTASSIUM SERPL-SCNC: 4.4 MMOL/L (ref 3.5–5.1)
SODIUM SERPL-SCNC: 134 MMOL/L (ref 137–145)

## 2022-12-19 RX ADMIN — ENOXAPARIN SODIUM SCH MG: 60 INJECTION SUBCUTANEOUS at 08:17

## 2022-12-19 RX ADMIN — ASPIRIN 81 MG CHEWABLE TABLET SCH MG: 81 TABLET CHEWABLE at 08:17

## 2022-12-19 RX ADMIN — ATORVASTATIN CALCIUM SCH MG: 40 TABLET, FILM COATED ORAL at 08:17

## 2022-12-19 RX ADMIN — IPRATROPIUM BROMIDE AND ALBUTEROL SULFATE SCH ML: .5; 3 SOLUTION RESPIRATORY (INHALATION) at 07:42

## 2022-12-19 RX ADMIN — FUROSEMIDE SCH MG: 10 INJECTION, SOLUTION INTRAMUSCULAR; INTRAVENOUS at 08:17

## 2022-12-19 RX ADMIN — IPRATROPIUM BROMIDE AND ALBUTEROL SULFATE SCH ML: .5; 3 SOLUTION RESPIRATORY (INHALATION) at 11:16

## 2022-12-19 RX ADMIN — PANTOPRAZOLE SODIUM SCH: 40 TABLET, DELAYED RELEASE ORAL at 06:11

## 2022-12-19 RX ADMIN — ISOSORBIDE MONONITRATE SCH MG: 30 TABLET, EXTENDED RELEASE ORAL at 08:17

## 2022-12-19 RX ADMIN — BUDESONIDE AND FORMOTEROL FUMARATE DIHYDRATE SCH PUFF: 160; 4.5 AEROSOL RESPIRATORY (INHALATION) at 07:43

## 2022-12-19 RX ADMIN — IPRATROPIUM BROMIDE AND ALBUTEROL SULFATE SCH ML: .5; 3 SOLUTION RESPIRATORY (INHALATION) at 15:48

## 2024-01-05 ENCOUNTER — HOSPITAL ENCOUNTER (OUTPATIENT)
Dept: HOSPITAL 47 - LABWHC1 | Age: 64
Discharge: HOME | End: 2024-01-05
Attending: DERMATOLOGY
Payer: MEDICARE

## 2024-01-05 DIAGNOSIS — L40.0: Primary | ICD-10-CM

## 2024-01-05 DIAGNOSIS — Z79.899: ICD-10-CM

## 2024-01-05 LAB
ALBUMIN SERPL-MCNC: 4.2 G/DL (ref 3.8–4.9)
ALBUMIN/GLOB SERPL: 1.4 RATIO (ref 1.6–3.17)
ALP SERPL-CCNC: 92 U/L (ref 41–126)
ALT SERPL-CCNC: 22 U/L (ref 10–49)
ANION GAP SERPL CALC-SCNC: 11.2 MMOL/L (ref 4–12)
AST SERPL-CCNC: 15 U/L (ref 14–35)
BASOPHILS # BLD AUTO: 0.06 X 10*3/UL (ref 0–0.1)
BASOPHILS NFR BLD AUTO: 0.8 %
BUN SERPL-SCNC: 14.2 MG/DL (ref 9–27)
BUN/CREAT SERPL: 17.75 RATIO (ref 12–20)
CALCIUM SPEC-MCNC: 9.6 MG/DL (ref 8.7–10.3)
CHLORIDE SERPL-SCNC: 100 MMOL/L (ref 96–109)
CO2 SERPL-SCNC: 27.8 MMOL/L (ref 21.6–31.8)
EOSINOPHIL # BLD AUTO: 0.11 X 10*3/UL (ref 0.04–0.35)
EOSINOPHIL NFR BLD AUTO: 1.4 %
ERYTHROCYTE [DISTWIDTH] IN BLOOD BY AUTOMATED COUNT: 5.6 X 10*6/UL (ref 4.4–5.6)
ERYTHROCYTE [DISTWIDTH] IN BLOOD: 14 % (ref 11.5–14.5)
GLOBULIN SER CALC-MCNC: 3 G/DL (ref 1.6–3.3)
GLUCOSE SERPL-MCNC: 107 MG/DL (ref 70–110)
HCT VFR BLD AUTO: 51.3 % (ref 39.6–50)
HGB BLD-MCNC: 16.4 G/DL (ref 13–17)
IMM GRANULOCYTES BLD QL AUTO: 0.4 %
LYMPHOCYTES # SPEC AUTO: 2.46 X 10*3/UL (ref 0.9–5)
LYMPHOCYTES NFR SPEC AUTO: 31.4 %
MCH RBC QN AUTO: 29.3 PG (ref 27–32)
MCHC RBC AUTO-ENTMCNC: 32 G/DL (ref 32–37)
MCV RBC AUTO: 91.6 FL (ref 80–97)
MONOCYTES # BLD AUTO: 0.89 X 10*3/UL (ref 0.2–1)
MONOCYTES NFR BLD AUTO: 11.4 %
NEUTROPHILS # BLD AUTO: 4.29 X 10*3/UL (ref 1.8–7.7)
NEUTROPHILS NFR BLD AUTO: 54.6 %
NRBC BLD AUTO-RTO: 0 X 10*3/UL (ref 0–0.01)
PLATELET # BLD AUTO: 234 X 10*3/UL (ref 140–440)
POTASSIUM SERPL-SCNC: 4.8 MMOL/L (ref 3.5–5.5)
PROT SERPL-MCNC: 7.2 G/DL (ref 6.2–8.2)
SODIUM SERPL-SCNC: 139 MMOL/L (ref 135–145)
WBC # BLD AUTO: 7.84 X 10*3/UL (ref 4.5–10)

## 2024-01-05 PROCEDURE — 86480 TB TEST CELL IMMUN MEASURE: CPT

## 2024-01-05 PROCEDURE — 85025 COMPLETE CBC W/AUTO DIFF WBC: CPT

## 2024-01-05 PROCEDURE — 80053 COMPREHEN METABOLIC PANEL: CPT

## 2024-01-05 PROCEDURE — 36415 COLL VENOUS BLD VENIPUNCTURE: CPT

## 2024-12-26 ENCOUNTER — HOSPITAL ENCOUNTER (OUTPATIENT)
Dept: HOSPITAL 47 - RADCTMAIN | Age: 64
Discharge: HOME | End: 2024-12-26
Attending: INTERNAL MEDICINE
Payer: MEDICARE

## 2024-12-26 DIAGNOSIS — R91.1: ICD-10-CM

## 2024-12-26 DIAGNOSIS — R04.2: Primary | ICD-10-CM

## 2024-12-26 LAB — BUN SERPL-SCNC: 16 MG/DL (ref 9–20)

## 2024-12-26 PROCEDURE — 84520 ASSAY OF UREA NITROGEN: CPT

## 2024-12-26 PROCEDURE — 36415 COLL VENOUS BLD VENIPUNCTURE: CPT

## 2024-12-26 PROCEDURE — 71260 CT THORAX DX C+: CPT

## 2024-12-26 PROCEDURE — 82565 ASSAY OF CREATININE: CPT

## 2024-12-30 ENCOUNTER — HOSPITAL ENCOUNTER (INPATIENT)
Dept: HOSPITAL 47 - EC | Age: 64
LOS: 4 days | Discharge: HOME | DRG: 638 | End: 2025-01-03
Attending: INTERNAL MEDICINE | Admitting: INTERNAL MEDICINE
Payer: MEDICARE

## 2024-12-30 DIAGNOSIS — E11.621: ICD-10-CM

## 2024-12-30 DIAGNOSIS — M79.7: ICD-10-CM

## 2024-12-30 DIAGNOSIS — Z95.1: ICD-10-CM

## 2024-12-30 DIAGNOSIS — J44.9: ICD-10-CM

## 2024-12-30 DIAGNOSIS — F17.200: ICD-10-CM

## 2024-12-30 DIAGNOSIS — I87.2: ICD-10-CM

## 2024-12-30 DIAGNOSIS — Z79.84: ICD-10-CM

## 2024-12-30 DIAGNOSIS — G47.33: ICD-10-CM

## 2024-12-30 DIAGNOSIS — I25.10: ICD-10-CM

## 2024-12-30 DIAGNOSIS — Z83.3: ICD-10-CM

## 2024-12-30 DIAGNOSIS — B35.3: ICD-10-CM

## 2024-12-30 DIAGNOSIS — K59.00: ICD-10-CM

## 2024-12-30 DIAGNOSIS — Z79.899: ICD-10-CM

## 2024-12-30 DIAGNOSIS — L03.116: ICD-10-CM

## 2024-12-30 DIAGNOSIS — L97.529: ICD-10-CM

## 2024-12-30 DIAGNOSIS — E11.628: Primary | ICD-10-CM

## 2024-12-30 DIAGNOSIS — Z79.4: ICD-10-CM

## 2024-12-30 DIAGNOSIS — Z79.82: ICD-10-CM

## 2024-12-30 LAB
ALBUMIN SERPL-MCNC: 4.2 G/DL (ref 3.5–5)
ALP SERPL-CCNC: 95 U/L (ref 38–126)
ALT SERPL-CCNC: 23 U/L (ref 4–49)
ANION GAP SERPL CALC-SCNC: 7 MMOL/L
AST SERPL-CCNC: 25 U/L (ref 17–59)
BASOPHILS # BLD AUTO: 0.1 K/UL (ref 0–0.2)
BASOPHILS NFR BLD AUTO: 1 %
BUN SERPL-SCNC: 13 MG/DL (ref 9–20)
CALCIUM SPEC-MCNC: 9.3 MG/DL (ref 8.4–10.2)
CHLORIDE SERPL-SCNC: 102 MMOL/L (ref 98–107)
CO2 SERPL-SCNC: 27 MMOL/L (ref 22–30)
EOSINOPHIL # BLD AUTO: 0.1 K/UL (ref 0–0.7)
EOSINOPHIL NFR BLD AUTO: 2 %
ERYTHROCYTE [DISTWIDTH] IN BLOOD BY AUTOMATED COUNT: 6.15 M/UL (ref 4.3–5.9)
ERYTHROCYTE [DISTWIDTH] IN BLOOD: 14.3 % (ref 11.5–15.5)
GLUCOSE BLD-MCNC: 86 MG/DL (ref 70–110)
GLUCOSE SERPL-MCNC: 109 MG/DL (ref 74–99)
HCT VFR BLD AUTO: 55.2 % (ref 39–53)
HGB BLD-MCNC: 18.1 GM/DL (ref 13–17.5)
LYMPHOCYTES # SPEC AUTO: 1.7 K/UL (ref 1–4.8)
LYMPHOCYTES NFR SPEC AUTO: 24 %
MCH RBC QN AUTO: 29.5 PG (ref 25–35)
MCHC RBC AUTO-ENTMCNC: 32.8 G/DL (ref 31–37)
MCV RBC AUTO: 89.7 FL (ref 80–100)
MONOCYTES # BLD AUTO: 0.5 K/UL (ref 0–1)
MONOCYTES NFR BLD AUTO: 7 %
NEUTROPHILS # BLD AUTO: 4.6 K/UL (ref 1.3–7.7)
NEUTROPHILS NFR BLD AUTO: 65 %
PLATELET # BLD AUTO: 184 K/UL (ref 150–450)
POTASSIUM SERPL-SCNC: 4.4 MMOL/L (ref 3.5–5.1)
PROT SERPL-MCNC: 7.1 G/DL (ref 6.3–8.2)
SODIUM SERPL-SCNC: 136 MMOL/L (ref 137–145)
WBC # BLD AUTO: 7 K/UL (ref 3.8–10.6)

## 2024-12-30 PROCEDURE — 96376 TX/PRO/DX INJ SAME DRUG ADON: CPT

## 2024-12-30 PROCEDURE — 96366 THER/PROPH/DIAG IV INF ADDON: CPT

## 2024-12-30 PROCEDURE — 86140 C-REACTIVE PROTEIN: CPT

## 2024-12-30 PROCEDURE — 36415 COLL VENOUS BLD VENIPUNCTURE: CPT

## 2024-12-30 PROCEDURE — 80053 COMPREHEN METABOLIC PANEL: CPT

## 2024-12-30 PROCEDURE — 96367 TX/PROPH/DG ADDL SEQ IV INF: CPT

## 2024-12-30 PROCEDURE — 87040 BLOOD CULTURE FOR BACTERIA: CPT

## 2024-12-30 PROCEDURE — 83036 HEMOGLOBIN GLYCOSYLATED A1C: CPT

## 2024-12-30 PROCEDURE — 96361 HYDRATE IV INFUSION ADD-ON: CPT

## 2024-12-30 PROCEDURE — 96375 TX/PRO/DX INJ NEW DRUG ADDON: CPT

## 2024-12-30 PROCEDURE — 96365 THER/PROPH/DIAG IV INF INIT: CPT

## 2024-12-30 PROCEDURE — 80048 BASIC METABOLIC PNL TOTAL CA: CPT

## 2024-12-30 PROCEDURE — 85025 COMPLETE CBC W/AUTO DIFF WBC: CPT

## 2024-12-30 PROCEDURE — 83605 ASSAY OF LACTIC ACID: CPT

## 2024-12-30 PROCEDURE — 99285 EMERGENCY DEPT VISIT HI MDM: CPT

## 2024-12-30 RX ADMIN — SODIUM CHLORIDE ONE MLS/HR: 9 INJECTION, SOLUTION INTRAVENOUS at 22:15

## 2024-12-30 RX ADMIN — HYDROMORPHONE HYDROCHLORIDE STA MG: 1 INJECTION, SOLUTION INTRAMUSCULAR; INTRAVENOUS; SUBCUTANEOUS at 21:28

## 2024-12-31 LAB
GLUCOSE BLD-MCNC: 108 MG/DL (ref 70–110)
GLUCOSE BLD-MCNC: 123 MG/DL (ref 70–110)
GLUCOSE BLD-MCNC: 137 MG/DL (ref 70–110)
GLUCOSE BLD-MCNC: 147 MG/DL (ref 70–110)

## 2024-12-31 RX ADMIN — NYSTATIN SCH APPLIC: 100000 CREAM TOPICAL at 16:55

## 2024-12-31 RX ADMIN — CEFAZOLIN SCH MLS/HR: 330 INJECTION, POWDER, FOR SOLUTION INTRAMUSCULAR; INTRAVENOUS at 01:39

## 2024-12-31 RX ADMIN — FAMOTIDINE SCH MG: 20 TABLET, FILM COATED ORAL at 11:16

## 2024-12-31 RX ADMIN — INSULIN ASPART SCH: 100 INJECTION, SOLUTION INTRAVENOUS; SUBCUTANEOUS at 08:53

## 2024-12-31 RX ADMIN — INSULIN DETEMIR SCH UNIT: 100 INJECTION, SOLUTION SUBCUTANEOUS at 11:24

## 2024-12-31 RX ADMIN — ATORVASTATIN CALCIUM SCH MG: 40 TABLET, FILM COATED ORAL at 20:19

## 2024-12-31 RX ADMIN — ASPIRIN 81 MG CHEWABLE TABLET SCH MG: 81 TABLET CHEWABLE at 11:16

## 2024-12-31 RX ADMIN — FUROSEMIDE SCH MG: 20 TABLET ORAL at 11:16

## 2024-12-31 RX ADMIN — ISOSORBIDE MONONITRATE SCH MG: 30 TABLET, EXTENDED RELEASE ORAL at 11:16

## 2024-12-31 RX ADMIN — SODIUM CHLORIDE SCH MLS/HR: 9 INJECTION, SOLUTION INTRAVENOUS at 06:16

## 2024-12-31 RX ADMIN — CEFEPIME HYDROCHLORIDE SCH MLS/HR: 2 INJECTION, POWDER, FOR SOLUTION INTRAVENOUS at 11:16

## 2024-12-31 RX ADMIN — ENOXAPARIN SODIUM SCH MG: 40 INJECTION SUBCUTANEOUS at 16:52

## 2024-12-31 RX ADMIN — TRIAMCINOLONE ACETONIDE SCH APPLIC: 1 CREAM TOPICAL at 16:54

## 2024-12-31 RX ADMIN — HYDROMORPHONE HYDROCHLORIDE PRN MG: 1 INJECTION, SOLUTION INTRAMUSCULAR; INTRAVENOUS; SUBCUTANEOUS at 01:42

## 2025-01-01 LAB
ANION GAP SERPL CALC-SCNC: 7.9 MMOL/L (ref 4–12)
BASOPHILS # BLD AUTO: 0.04 X 10*3/UL (ref 0–0.1)
BASOPHILS NFR BLD AUTO: 0.6 %
BUN SERPL-SCNC: 9.4 MG/DL (ref 9–27)
BUN/CREAT SERPL: 13.43 RATIO (ref 12–20)
CALCIUM SPEC-MCNC: 9 MG/DL (ref 8.7–10.3)
CHLORIDE SERPL-SCNC: 101 MMOL/L (ref 96–109)
CO2 SERPL-SCNC: 28.1 MMOL/L (ref 21.6–31.8)
EOSINOPHIL # BLD AUTO: 0.11 X 10*3/UL (ref 0.04–0.35)
EOSINOPHIL NFR BLD AUTO: 1.7 %
ERYTHROCYTE [DISTWIDTH] IN BLOOD BY AUTOMATED COUNT: 5.64 X 10*6/UL (ref 4.4–5.6)
ERYTHROCYTE [DISTWIDTH] IN BLOOD: 14.6 % (ref 11.5–14.5)
GLUCOSE BLD-MCNC: 101 MG/DL (ref 70–110)
GLUCOSE BLD-MCNC: 104 MG/DL (ref 70–110)
GLUCOSE BLD-MCNC: 118 MG/DL (ref 70–110)
GLUCOSE BLD-MCNC: 233 MG/DL (ref 70–110)
GLUCOSE SERPL-MCNC: 121 MG/DL (ref 70–110)
HCT VFR BLD AUTO: 50.4 % (ref 39.6–50)
HGB BLD-MCNC: 15.7 G/DL (ref 13–17)
IMM GRANULOCYTES BLD QL AUTO: 0.5 %
LYMPHOCYTES # SPEC AUTO: 1.41 X 10*3/UL (ref 0.9–5)
LYMPHOCYTES NFR SPEC AUTO: 21.6 %
MCH RBC QN AUTO: 27.8 PG (ref 27–32)
MCHC RBC AUTO-ENTMCNC: 31.2 G/DL (ref 32–37)
MCV RBC AUTO: 89.4 FL (ref 80–97)
MONOCYTES # BLD AUTO: 0.82 X 10*3/UL (ref 0.2–1)
MONOCYTES NFR BLD AUTO: 12.5 %
NEUTROPHILS # BLD AUTO: 4.13 X 10*3/UL (ref 1.8–7.7)
NEUTROPHILS NFR BLD AUTO: 63.1 %
NRBC BLD AUTO-RTO: 0 X 10*3/UL (ref 0–0.01)
PLATELET # BLD AUTO: 202 X 10*3/UL (ref 140–440)
POTASSIUM SERPL-SCNC: 4.9 MMOL/L (ref 3.5–5.5)
SODIUM SERPL-SCNC: 137 MMOL/L (ref 135–145)
WBC # BLD AUTO: 6.54 X 10*3/UL (ref 4.5–10)

## 2025-01-01 RX ADMIN — NYSTATIN SCH APPLIC: 100000 POWDER TOPICAL at 13:25

## 2025-01-01 RX ADMIN — EZETIMIBE SCH MG: 10 TABLET ORAL at 08:42

## 2025-01-02 LAB
GLUCOSE BLD-MCNC: 114 MG/DL (ref 70–110)
GLUCOSE BLD-MCNC: 124 MG/DL (ref 70–110)
GLUCOSE BLD-MCNC: 139 MG/DL (ref 70–110)
GLUCOSE BLD-MCNC: 95 MG/DL (ref 70–110)

## 2025-01-02 RX ADMIN — DOCUSATE SODIUM SCH MG: 100 CAPSULE, LIQUID FILLED ORAL at 11:47

## 2025-01-03 VITALS
TEMPERATURE: 97.7 F | SYSTOLIC BLOOD PRESSURE: 160 MMHG | DIASTOLIC BLOOD PRESSURE: 87 MMHG | RESPIRATION RATE: 16 BRPM | HEART RATE: 61 BPM

## 2025-01-03 LAB
ALBUMIN SERPL-MCNC: 3.8 G/DL (ref 3.8–4.9)
ALBUMIN/GLOB SERPL: 1.41 RATIO (ref 1.6–3.17)
ALP SERPL-CCNC: 91 U/L (ref 41–126)
ALT SERPL-CCNC: 24 U/L (ref 10–49)
ANION GAP SERPL CALC-SCNC: 8.2 MMOL/L (ref 4–12)
AST SERPL-CCNC: 25 U/L (ref 14–35)
BASOPHILS # BLD AUTO: 0.04 X 10*3/UL (ref 0–0.1)
BASOPHILS NFR BLD AUTO: 0.6 %
BUN SERPL-SCNC: 11.5 MG/DL (ref 9–27)
BUN/CREAT SERPL: 16.43 RATIO (ref 12–20)
CALCIUM SPEC-MCNC: 9 MG/DL (ref 8.7–10.3)
CHLORIDE SERPL-SCNC: 101 MMOL/L (ref 96–109)
CO2 SERPL-SCNC: 27.8 MMOL/L (ref 21.6–31.8)
EOSINOPHIL # BLD AUTO: 0.09 X 10*3/UL (ref 0.04–0.35)
EOSINOPHIL NFR BLD AUTO: 1.4 %
ERYTHROCYTE [DISTWIDTH] IN BLOOD BY AUTOMATED COUNT: 5.88 X 10*6/UL (ref 4.4–5.6)
ERYTHROCYTE [DISTWIDTH] IN BLOOD: 14.3 % (ref 11.5–14.5)
GLOBULIN SER CALC-MCNC: 2.7 G/DL (ref 1.6–3.3)
GLUCOSE SERPL-MCNC: 152 MG/DL (ref 70–110)
HCT VFR BLD AUTO: 52.9 % (ref 39.6–50)
HGB BLD-MCNC: 16.1 G/DL (ref 13–17)
IMM GRANULOCYTES BLD QL AUTO: 0.3 %
LYMPHOCYTES # SPEC AUTO: 1.94 X 10*3/UL (ref 0.9–5)
LYMPHOCYTES NFR SPEC AUTO: 31.2 %
MCH RBC QN AUTO: 27.4 PG (ref 27–32)
MCHC RBC AUTO-ENTMCNC: 30.4 G/DL (ref 32–37)
MCV RBC AUTO: 90 FL (ref 80–97)
MONOCYTES # BLD AUTO: 0.83 X 10*3/UL (ref 0.2–1)
MONOCYTES NFR BLD AUTO: 13.3 %
NEUTROPHILS # BLD AUTO: 3.3 X 10*3/UL (ref 1.8–7.7)
NEUTROPHILS NFR BLD AUTO: 53.2 %
NRBC BLD AUTO-RTO: 0 X 10*3/UL (ref 0–0.01)
PLATELET # BLD AUTO: 216 X 10*3/UL (ref 140–440)
POTASSIUM SERPL-SCNC: 4.7 MMOL/L (ref 3.5–5.5)
PROT SERPL-MCNC: 6.5 G/DL (ref 6.2–8.2)
SODIUM SERPL-SCNC: 137 MMOL/L (ref 135–145)
WBC # BLD AUTO: 6.22 X 10*3/UL (ref 4.5–10)

## 2025-01-17 ENCOUNTER — HOSPITAL ENCOUNTER (OUTPATIENT)
Dept: HOSPITAL 47 - LABWHC1 | Age: 65
Discharge: HOME | End: 2025-01-17
Attending: DERMATOLOGY
Payer: MEDICARE

## 2025-01-17 DIAGNOSIS — L40.0: Primary | ICD-10-CM

## 2025-01-17 DIAGNOSIS — Z79.899: ICD-10-CM

## 2025-01-17 LAB
ALT SERPL-CCNC: 29 U/L (ref 10–49)
AST SERPL-CCNC: 34 U/L (ref 14–35)
BASOPHILS # BLD AUTO: 0 K/UL (ref 0–0.2)
BASOPHILS NFR BLD AUTO: 1 %
EOSINOPHIL # BLD AUTO: 0.1 K/UL (ref 0–0.7)
EOSINOPHIL NFR BLD AUTO: 1 %
ERYTHROCYTE [DISTWIDTH] IN BLOOD BY AUTOMATED COUNT: 5.73 M/UL (ref 4.3–5.9)
ERYTHROCYTE [DISTWIDTH] IN BLOOD: 14.1 % (ref 11.5–15.5)
HCT VFR BLD AUTO: 51.3 % (ref 39–53)
HGB BLD-MCNC: 16.4 GM/DL (ref 13–17.5)
LYMPHOCYTES # SPEC AUTO: 1.9 K/UL (ref 1–4.8)
LYMPHOCYTES NFR SPEC AUTO: 24 %
MCH RBC QN AUTO: 28.6 PG (ref 25–35)
MCHC RBC AUTO-ENTMCNC: 31.9 G/DL (ref 31–37)
MCV RBC AUTO: 89.5 FL (ref 80–100)
MONOCYTES # BLD AUTO: 0.8 K/UL (ref 0–1)
MONOCYTES NFR BLD AUTO: 10 %
NEUTROPHILS # BLD AUTO: 5.1 K/UL (ref 1.3–7.7)
NEUTROPHILS NFR BLD AUTO: 64 %
PLATELET # BLD AUTO: 169 K/UL (ref 150–450)
WBC # BLD AUTO: 8 K/UL (ref 3.8–10.6)

## 2025-01-17 PROCEDURE — 84450 TRANSFERASE (AST) (SGOT): CPT

## 2025-01-17 PROCEDURE — 86480 TB TEST CELL IMMUN MEASURE: CPT

## 2025-01-17 PROCEDURE — 82565 ASSAY OF CREATININE: CPT

## 2025-01-17 PROCEDURE — 85025 COMPLETE CBC W/AUTO DIFF WBC: CPT

## 2025-01-17 PROCEDURE — 84460 ALANINE AMINO (ALT) (SGPT): CPT

## 2025-01-17 PROCEDURE — 36415 COLL VENOUS BLD VENIPUNCTURE: CPT

## 2025-06-10 ENCOUNTER — HOSPITAL ENCOUNTER (OUTPATIENT)
Dept: HOSPITAL 47 - ORWHC2ENDO | Age: 65
Discharge: HOME | End: 2025-06-10
Attending: INTERNAL MEDICINE
Payer: MEDICARE

## 2025-06-10 VITALS — RESPIRATION RATE: 22 BRPM | HEART RATE: 65 BPM | DIASTOLIC BLOOD PRESSURE: 86 MMHG | SYSTOLIC BLOOD PRESSURE: 129 MMHG

## 2025-06-10 VITALS — TEMPERATURE: 97.8 F

## 2025-06-10 DIAGNOSIS — E66.01: ICD-10-CM

## 2025-06-10 DIAGNOSIS — E11.9: ICD-10-CM

## 2025-06-10 DIAGNOSIS — D72.820: ICD-10-CM

## 2025-06-10 DIAGNOSIS — Z79.899: ICD-10-CM

## 2025-06-10 DIAGNOSIS — Z95.5: ICD-10-CM

## 2025-06-10 DIAGNOSIS — Z79.82: ICD-10-CM

## 2025-06-10 DIAGNOSIS — Z79.4: ICD-10-CM

## 2025-06-10 DIAGNOSIS — I25.10: ICD-10-CM

## 2025-06-10 DIAGNOSIS — K21.00: ICD-10-CM

## 2025-06-10 DIAGNOSIS — Z79.84: ICD-10-CM

## 2025-06-10 DIAGNOSIS — Z79.51: ICD-10-CM

## 2025-06-10 DIAGNOSIS — K29.50: Primary | ICD-10-CM

## 2025-06-10 DIAGNOSIS — J44.9: ICD-10-CM

## 2025-06-10 DIAGNOSIS — F17.200: ICD-10-CM

## 2025-06-10 LAB — GLUCOSE BLD-MCNC: 116 MG/DL (ref 70–110)

## 2025-06-10 PROCEDURE — 88342 IMHCHEM/IMCYTCHM 1ST ANTB: CPT

## 2025-06-10 PROCEDURE — 43239 EGD BIOPSY SINGLE/MULTIPLE: CPT

## 2025-06-10 PROCEDURE — 88305 TISSUE EXAM BY PATHOLOGIST: CPT

## 2025-06-10 RX ADMIN — POTASSIUM CHLORIDE ONE MLS: 14.9 INJECTION, SOLUTION INTRAVENOUS at 11:20
